# Patient Record
Sex: FEMALE | Race: WHITE
[De-identification: names, ages, dates, MRNs, and addresses within clinical notes are randomized per-mention and may not be internally consistent; named-entity substitution may affect disease eponyms.]

---

## 2017-02-20 ENCOUNTER — HOSPITAL ENCOUNTER (INPATIENT)
Dept: HOSPITAL 17 - PHED | Age: 82
LOS: 3 days | Discharge: HOME | DRG: 309 | End: 2017-02-23
Attending: HOSPITALIST | Admitting: HOSPITALIST
Payer: COMMERCIAL

## 2017-02-20 VITALS
RESPIRATION RATE: 18 BRPM | OXYGEN SATURATION: 93 % | DIASTOLIC BLOOD PRESSURE: 73 MMHG | TEMPERATURE: 98.9 F | HEART RATE: 87 BPM | SYSTOLIC BLOOD PRESSURE: 139 MMHG

## 2017-02-20 VITALS
OXYGEN SATURATION: 92 % | HEART RATE: 104 BPM | DIASTOLIC BLOOD PRESSURE: 71 MMHG | TEMPERATURE: 97.7 F | SYSTOLIC BLOOD PRESSURE: 172 MMHG | RESPIRATION RATE: 18 BRPM

## 2017-02-20 VITALS — WEIGHT: 101.19 LBS | HEIGHT: 61 IN | BODY MASS INDEX: 19.11 KG/M2

## 2017-02-20 VITALS
OXYGEN SATURATION: 96 % | TEMPERATURE: 97.1 F | SYSTOLIC BLOOD PRESSURE: 124 MMHG | DIASTOLIC BLOOD PRESSURE: 63 MMHG | RESPIRATION RATE: 18 BRPM | HEART RATE: 82 BPM

## 2017-02-20 VITALS
OXYGEN SATURATION: 93 % | TEMPERATURE: 98.8 F | SYSTOLIC BLOOD PRESSURE: 172 MMHG | HEART RATE: 95 BPM | DIASTOLIC BLOOD PRESSURE: 80 MMHG | RESPIRATION RATE: 18 BRPM

## 2017-02-20 VITALS
DIASTOLIC BLOOD PRESSURE: 70 MMHG | OXYGEN SATURATION: 95 % | HEART RATE: 93 BPM | RESPIRATION RATE: 18 BRPM | SYSTOLIC BLOOD PRESSURE: 155 MMHG | TEMPERATURE: 98.6 F

## 2017-02-20 VITALS
SYSTOLIC BLOOD PRESSURE: 156 MMHG | DIASTOLIC BLOOD PRESSURE: 75 MMHG | RESPIRATION RATE: 20 BRPM | HEART RATE: 99 BPM | TEMPERATURE: 98 F | OXYGEN SATURATION: 93 %

## 2017-02-20 VITALS
RESPIRATION RATE: 18 BRPM | SYSTOLIC BLOOD PRESSURE: 178 MMHG | OXYGEN SATURATION: 91 % | DIASTOLIC BLOOD PRESSURE: 68 MMHG | HEART RATE: 98 BPM

## 2017-02-20 VITALS
DIASTOLIC BLOOD PRESSURE: 95 MMHG | TEMPERATURE: 97.4 F | RESPIRATION RATE: 20 BRPM | SYSTOLIC BLOOD PRESSURE: 199 MMHG | OXYGEN SATURATION: 97 % | HEART RATE: 105 BPM

## 2017-02-20 VITALS — HEART RATE: 97 BPM

## 2017-02-20 DIAGNOSIS — I48.0: Primary | ICD-10-CM

## 2017-02-20 DIAGNOSIS — H91.90: ICD-10-CM

## 2017-02-20 DIAGNOSIS — N39.0: ICD-10-CM

## 2017-02-20 DIAGNOSIS — Z85.048: ICD-10-CM

## 2017-02-20 DIAGNOSIS — Z86.718: ICD-10-CM

## 2017-02-20 DIAGNOSIS — E78.00: ICD-10-CM

## 2017-02-20 DIAGNOSIS — E03.9: ICD-10-CM

## 2017-02-20 DIAGNOSIS — K56.41: ICD-10-CM

## 2017-02-20 DIAGNOSIS — Z79.02: ICD-10-CM

## 2017-02-20 DIAGNOSIS — I10: ICD-10-CM

## 2017-02-20 DIAGNOSIS — Z92.3: ICD-10-CM

## 2017-02-20 DIAGNOSIS — Z92.21: ICD-10-CM

## 2017-02-20 DIAGNOSIS — K21.9: ICD-10-CM

## 2017-02-20 DIAGNOSIS — E78.5: ICD-10-CM

## 2017-02-20 DIAGNOSIS — K62.5: ICD-10-CM

## 2017-02-20 LAB
ALP SERPL-CCNC: 119 U/L (ref 45–117)
ALT SERPL-CCNC: 37 U/L (ref 10–53)
ANION GAP SERPL CALC-SCNC: 11 MEQ/L (ref 5–15)
APTT BLD: 28.3 SEC (ref 24.3–30.1)
AST SERPL-CCNC: 22 U/L (ref 15–37)
BASOPHILS # BLD AUTO: 0.1 TH/MM3 (ref 0–0.2)
BASOPHILS NFR BLD: 0.4 % (ref 0–2)
BILIRUB SERPL-MCNC: 0.3 MG/DL (ref 0.2–1)
BUN SERPL-MCNC: 28 MG/DL (ref 7–18)
CHLORIDE SERPL-SCNC: 107 MEQ/L (ref 98–107)
COLOR UR: YELLOW
COMMENT (UR): (no result)
CULTURE IF INDICATED: (no result)
EOSINOPHIL # BLD: 0.1 TH/MM3 (ref 0–0.4)
EOSINOPHIL NFR BLD: 0.4 % (ref 0–4)
ERYTHROCYTE [DISTWIDTH] IN BLOOD BY AUTOMATED COUNT: 13.5 % (ref 11.6–17.2)
GFR SERPLBLD BASED ON 1.73 SQ M-ARVRAT: 47 ML/MIN (ref 89–?)
GLUCOSE UR STRIP-MCNC: (no result) MG/DL
HCO3 BLD-SCNC: 22.6 MEQ/L (ref 21–32)
HCT VFR BLD CALC: 36.8 % (ref 35–46)
HCT VFR BLD CALC: 37.9 % (ref 35–46)
HEMO FLAGS: (no result)
HGB UR QL STRIP: (no result)
HYALINE CASTS #/AREA URNS LPF: (no result) /LPF
INR PPP: 0.9 RATIO
KETONES UR STRIP-MCNC: (no result) MG/DL
LEUKOCYTE ESTERASE UR QL STRIP: (no result) /HPF (ref 0–5)
LYMPHOCYTES # BLD AUTO: 0.6 TH/MM3 (ref 1–4.8)
LYMPHOCYTES NFR BLD AUTO: 4.5 % (ref 9–44)
MCH RBC QN AUTO: 30.8 PG (ref 27–34)
MCHC RBC AUTO-ENTMCNC: 33.3 % (ref 32–36)
MCV RBC AUTO: 92.5 FL (ref 80–100)
METHOD OF COLLECTION: (no result)
MONOCYTES NFR BLD: 3.8 % (ref 0–8)
MUCOUS THREADS #/AREA URNS LPF: (no result) /LPF
NEUTROPHILS # BLD AUTO: 11.2 TH/MM3 (ref 1.8–7.7)
NEUTROPHILS NFR BLD AUTO: 90.9 % (ref 16–70)
NITRITE UR QL STRIP: (no result)
PLATELET # BLD: 218 TH/MM3 (ref 150–450)
POTASSIUM SERPL-SCNC: 3.8 MEQ/L (ref 3.5–5.1)
PROTHROMBIN TIME: 10.3 SEC (ref 9.8–11.6)
RBC # BLD AUTO: 4.09 MIL/MM3 (ref 4–5.3)
REVIEW FLAG: (no result)
SODIUM SERPL-SCNC: 141 MEQ/L (ref 136–145)
SP GR UR STRIP: 1.02 (ref 1–1.03)
SQUAMOUS #/AREA URNS HPF: (no result) /HPF (ref 0–5)
WBC # BLD AUTO: 12.5 TH/MM3 (ref 4–11)

## 2017-02-20 PROCEDURE — 85610 PROTHROMBIN TIME: CPT

## 2017-02-20 PROCEDURE — 85014 HEMATOCRIT: CPT

## 2017-02-20 PROCEDURE — 96374 THER/PROPH/DIAG INJ IV PUSH: CPT

## 2017-02-20 PROCEDURE — 74020: CPT

## 2017-02-20 PROCEDURE — 85018 HEMOGLOBIN: CPT

## 2017-02-20 PROCEDURE — 80053 COMPREHEN METABOLIC PANEL: CPT

## 2017-02-20 PROCEDURE — 81001 URINALYSIS AUTO W/SCOPE: CPT

## 2017-02-20 PROCEDURE — 84443 ASSAY THYROID STIM HORMONE: CPT

## 2017-02-20 PROCEDURE — 87077 CULTURE AEROBIC IDENTIFY: CPT

## 2017-02-20 PROCEDURE — 80048 BASIC METABOLIC PNL TOTAL CA: CPT

## 2017-02-20 PROCEDURE — 83735 ASSAY OF MAGNESIUM: CPT

## 2017-02-20 PROCEDURE — 71010: CPT

## 2017-02-20 PROCEDURE — 87086 URINE CULTURE/COLONY COUNT: CPT

## 2017-02-20 PROCEDURE — 87186 SC STD MICRODIL/AGAR DIL: CPT

## 2017-02-20 PROCEDURE — 93005 ELECTROCARDIOGRAM TRACING: CPT

## 2017-02-20 PROCEDURE — 84484 ASSAY OF TROPONIN QUANT: CPT

## 2017-02-20 PROCEDURE — 85730 THROMBOPLASTIN TIME PARTIAL: CPT

## 2017-02-20 PROCEDURE — 96361 HYDRATE IV INFUSION ADD-ON: CPT

## 2017-02-20 PROCEDURE — 83690 ASSAY OF LIPASE: CPT

## 2017-02-20 PROCEDURE — G0378 HOSPITAL OBSERVATION PER HR: HCPCS

## 2017-02-20 PROCEDURE — 85025 COMPLETE CBC W/AUTO DIFF WBC: CPT

## 2017-02-20 PROCEDURE — 93306 TTE W/DOPPLER COMPLETE: CPT

## 2017-02-20 PROCEDURE — 82550 ASSAY OF CK (CPK): CPT

## 2017-02-20 RX ADMIN — SODIUM CHLORIDE, PRESERVATIVE FREE SCH ML: 5 INJECTION INTRAVENOUS at 08:09

## 2017-02-20 RX ADMIN — CLOPIDOGREL BISULFATE SCH MG: 75 TABLET, FILM COATED ORAL at 09:00

## 2017-02-20 RX ADMIN — STANDARDIZED SENNA CONCENTRATE AND DOCUSATE SODIUM SCH TAB: 8.6; 5 TABLET, FILM COATED ORAL at 21:00

## 2017-02-20 RX ADMIN — STANDARDIZED SENNA CONCENTRATE AND DOCUSATE SODIUM SCH TAB: 8.6; 5 TABLET, FILM COATED ORAL at 09:00

## 2017-02-20 RX ADMIN — PRAVASTATIN SODIUM SCH MG: 20 TABLET ORAL at 09:00

## 2017-02-20 RX ADMIN — LEVOTHYROXINE SODIUM SCH MCG: 75 TABLET ORAL at 09:00

## 2017-02-20 RX ADMIN — SODIUM CHLORIDE, PRESERVATIVE FREE SCH ML: 5 INJECTION INTRAVENOUS at 21:50

## 2017-02-20 RX ADMIN — MULTIPLE VITAMINS W/ MINERALS TAB SCH TAB: TAB at 09:00

## 2017-02-20 NOTE — HHI.HP
__________________________________________________





HPI


Service


[facility]


Primary Care Physician


Jovany Ugarte MD


Admission Diagnosis


rectal bleed, rectal pain, constipation, fecal impaction, rectal can


Diagnoses:  


Travel History


International Travel<30 Days:  No


Contact w/Intl Traveler <30 Da:  No


Traveled to Known Affected Are:  No


History of Present Illness


This is a pleasant 86-year-old  female with past medical history of 

rectal cancer status post partial removal of her rectum years ago with Dr. Meese who presents with a five-day history of constipation.  The patient states 

that over the past year she has had cycles of loose stools followed by 

constipation.  She is complaining of some rectal pain.  Apparently she had a 

small amount of bright red blood from her rectum last night.  The patient had 

abdominal x-ray in the emergency department showing normal gas pattern.  Guaiac 

was positive for blood per rectum and she was noted to have a fecal impaction 

on rectal exam.  The patient was given magnesium citrate and this morning is 

having copious loose bowel movements.  She states that she still has pain in 

the rectum as well as some mild abdominal cramping prior to bowel movements but 

denies any further bleeding.  Hemoglobin is stable this morning.  The patient 

denies any recent weight loss, abdominal pain nausea or vomiting.





Review of Systems


Except as stated in HPI:  all other systems reviewed are Neg





Past Family Social History


Past Medical History


hypertension


colon cancer


dyslipidemia


hypothyroidism


Questionable history of TIA


Reported Medications





 Allergies








Coded Allergies Type Severity Reaction Last Updated Verified


 


  No Known Allergies    2/20/17 Yes








 Active Scripts








 Medications  Dose


 Route/Sig Days Date Category


 


 Levothyroxine


  (Levothyroxine


 Sodium) 75 Mcg Tab  75 Mcg


 PO DAILY   2/20/17 Reported


 


 Centrum (Multiple


 Vitamins W/


 Minerals) 1 Tab  1 Tab


 PO DAILY   2/20/17 Reported


 


 Vitamin C


  (Ascorbic Acid)


 500 Mg Cap  500 Mg


 PO   2/20/17 Reported


 


 Ventolin Hfa 18


 GM Inh (Albuterol


 Sulfate) 90


 Mcg/Act Aer  2 Puff


 INH Q4-6H PRN   2/20/17 Reported


 


 Amlodipine


  (Amlodipine


 Besylate) 5 Mg Tab  5 Mg


 PO DAILY   2/20/17 Reported


 


 Pravastatin 20 Mg


 Tab  20 Mg


 PO DAILY   2/20/17 Reported


 


 Plavix


  (Clopidogrel


 Bisulfate) 75 Mg


 Tab  75 Mg


 PO DAILY   2/20/17 Reported








Allergies:  


Coded Allergies:  


     No Known Allergies (Verified , 2/20/17)


Family History


Reviewed and noncontributory


Social History


No alcohol tobacco or drug use.  She is .





Physical Exam


Vital Signs





 Vital Signs








  Date Time  Temp Pulse Resp B/P Pulse Ox O2 Delivery O2 Flow Rate FiO2


 


2/20/17 08:00 98.8 95 18 172/80 93   


 


2/20/17 05:45  97      


 


2/20/17 05:20 98.0 99 20 156/75 93   


 


2/20/17 04:27 97.7 104 18 172/71 92 Room Air  


 


2/20/17 03:31  98 18 178/68 91 Room Air  


 


2/20/17 01:28 97.4 105 20 199/95 97   








Physical Exam


GENERAL: Well-nourished, well-developed pleasant elderly  female 

patient.


SKIN: Warm and dry.


HEAD: Normocephalic.


EYES: No scleral icterus. No injection or drainage. 


NECK: Supple, trachea midline. No JVD or lymphadenopathy.


CARDIOVASCULAR: Regular rate and rhythm without murmurs, gallops, or rubs. 


RESPIRATORY: Breath sounds equal and clear to auscultation bilaterally. No 

accessory muscle use.


GASTROINTESTINAL: Bowel sounds hyperactive Abdomen soft, non-tender, 

nondistended. 


EXTREMITIES: No cyanosis, or edema. 


NEUROLOGICAL: Awake, alert, and oriented x 3. Non-focal.


Laboratory





Laboratory Tests








Test 2/20/17 2/20/17





 02:25 08:50


 


White Blood Count 12.5  


 


Red Blood Count 4.09  


 


Hemoglobin 12.6  12.4 


 


Hematocrit 37.9  36.8 


 


Mean Corpuscular Volume 92.5  


 


Mean Corpuscular Hemoglobin 30.8  


 


Mean Corpuscular Hemoglobin 33.3  





Concent  


 


Red Cell Distribution Width 13.5  


 


Platelet Count 218  


 


Mean Platelet Volume 9.1  


 


Neutrophils (%) (Auto) 90.9  


 


Lymphocytes (%) (Auto) 4.5  


 


Monocytes (%) (Auto) 3.8  


 


Eosinophils (%) (Auto) 0.4  


 


Basophils (%) (Auto) 0.4  


 


Neutrophils # (Auto) 11.2  


 


Lymphocytes # (Auto) 0.6  


 


Monocytes # (Auto) 0.5  


 


Eosinophils # (Auto) 0.1  


 


Basophils # (Auto) 0.1  


 


CBC Comment DIFF FINAL  


 


Differential Comment   


 


Prothrombin Time 10.3  


 


Prothromb Time International 0.9  





Ratio  


 


Activated Partial 28.3  





Thromboplast Time  


 


Urine Collection Type CATH  


 


Urine Color YELLOW  


 


Urine Turbidity CLEAR  


 


Urine pH 6.0  


 


Urine Specific Gravity 1.017  


 


Urine Protein 100  


 


Urine Glucose (UA) NEG  


 


Urine Ketones NEG  


 


Urine Occult Blood NEG  


 


Urine Nitrite NEG  


 


Urine Bilirubin NEG  


 


Urine Leukocyte Esterase NEG  


 


Urine WBC 0-2  


 


Urine Squamous Epithelial 0-5  





Cells  


 


Urine Renal Epithelial Cells   


 


Urine Hyaline Casts 0-2  


 


Urine Mucus OCC  


 


Microscopic Urinalysis Comment CATH-CULT NOT 





 IND 


 


Sodium Level 141  


 


Potassium Level 3.8  


 


Chloride Level 107  


 


Carbon Dioxide Level 22.6  


 


Anion Gap 11  


 


Blood Urea Nitrogen 28  


 


Creatinine 1.10  


 


Estimat Glomerular Filtration 47  





Rate  


 


Random Glucose 163  


 


Calcium Level 9.1  


 


Total Bilirubin 0.3  


 


Aspartate Amino Transf 22  





(AST/SGOT)  


 


Alanine Aminotransferase 37  





(ALT/SGPT)  


 


Alkaline Phosphatase 119  


 


Total Protein 7.7  


 


Albumin 4.2  


 


Lipase 272  








Result Diagram:  


2/20/17 0850                                                                   

             2/20/17 0225





Imaging





Last Impressions








Abdomen X-Ray 2/20/17 0431 Signed





Impressions: 





 Service Date/Time:  Monday, February 20, 2017 04:47 - CONCLUSION:  Negative 





 abdominal series. An acute abnormality is not seen. There is chronic change 





 described above.     Taye Coyle MD 











Assessment and Plan


Problem List:  


(1) Fecal impaction in rectum


ICD Code:  K56.41


Status:  Acute


(2) Rectal bleeding


ICD Code:  K62.5


Status:  Acute


Assessment and Plan











-Rectal impaction, resolved with mag citrate now having numerous loose stools.


-History of rectal cancer resected years ago with Dr. Fernandez.


-Trace amount of rectal bleeding likely related to the rectal impaction now 

resolved with stable hemoglobin.


-History of DVT for which she apparently takes Plavix.  She denies any history 

of coronary artery disease.


-hypertension


-dyslipidemia


-hypothyroidism








We will observe the patient through this morning.  Obtain a physical therapy 

consultation.  If she does well she can be discharged home this afternoon.  She 

needs to stay on a good bowel regimen as she has had a year-long history of 

constipation alternating with loose stools and this may be indicative of 

recurrent fecal impaction.  There was no mass identified via digital 

examination of her rectum by the ER physician.  I recommend she follow-up with 

her primary care physician and may consider a follow-up appointment with Dr. Fernandez who performed her surgery for rectal carcinoma years ago.





Plan of care discussed with the patient and her  at bedside who are in 

agreement with plan.








Gloria Moran MD Feb 20, 2017 10:34

## 2017-02-20 NOTE — PD
HPI


Chief Complaint:  GI Complaint


Time Seen by Provider:  02:02


Travel History


International Travel<30 days:  No


Contact w/Intl Traveler<30days:  No


Traveled to known affect area:  No





History of Present Illness


HPI


The patient is an 86-year-old female who states she had diarrhea on Tuesday, 5 

days ago, and she hasn't had a bowel movement since.  She states she had noted 

blood passing from the rectum tonight and this is why she came in.  She states 

she has a cycle of diarrhea and then no bowel movement for a number of days for 

the past year.  She has a history of rectal cancer, and 1996 Dr. Fernandez removed 

a portion of her rectum.  She has not seen Dr. Fernandez in for years.  She is on 

Plavix because of deep vein thrombosis.  She denies any syncopal or near 

syncopal spells.  She does have rectal pain.





PFSH


Past Medical History


Hx Anticoagulant Therapy:  Yes (PLAVIX - BLOOD CLOT R FEMORAL )


Arthritis:  Yes


Autoimmune Disease:  No


Blood Disorders:  No


Anxiety:  No


Depression:  No


Cancer:  Yes (hx rectal ca 17 years ago)


Cardiovascular Problems:  Yes (BLOOD CLOTS LEGS)


High Cholesterol:  Yes


Chemotherapy:  Yes (1996)


Chest Pain:  No


Congestive Heart Failure:  No


Diminished Hearing:  Yes (NOT WITH PT THIS VISIT 2/20/17)


Deep Vein Thrombosis:  Yes (R GROIN )


Endocrine:  Yes (hypothyroid)


Gastrointestinal Disorders:  Yes


GERD:  Yes


Genitourinary:  Yes (STENT LEFT KIDNEY)


Hypertension:  Yes


Immune Disorder:  No


Implanted Vascular Access Dvce:  Yes


Musculoskeletal:  Yes


Neurologic:  No


Psychiatric:  No


Reproductive:  No


Respiratory:  No


Radiation Therapy:  Yes (1996)


Sickle Cell Disease:  No


Thyroid Disease:  Yes (HYPO)


Menopausal:  Yes





Past Surgical History


Abdominal Surgery:  Yes (APPPRADEEP, CHOLEY)


Appendectomy:  Yes


Body Medical Devices:  RENAL STENT


Cardiac Surgery:  No


Cholecystectomy:  Yes


Genitourinary Surgery:  Yes (RENAL STENT, RECTAL CANCER)


Gynecologic Surgery:  No


Hysterectomy:  Yes


Thoracic Surgery:  No


Tonsillectomy:  Yes


Other Surgery:  Yes





Social History


Alcohol Use:  No


Tobacco Use:  No (quit 30 years ago)


Substance Use:  No





Allergies-Medications


(Allergen,Severity, Reaction):  


Coded Allergies:  


     No Known Allergies (Verified , 2/20/17)


Reported Meds & Prescriptions





Reported Meds & Active Scripts


Active


Reported


Levothyroxine (Levothyroxine Sodium) 75 Mcg Tab 75 Mcg PO DAILY


Centrum (Multiple Vitamins W/ Minerals) 1 Tab 1 Tab PO DAILY


Vitamin C (Ascorbic Acid) 500 Mg Cap 500 Mg PO 


Ventolin Hfa 18 GM Inh (Albuterol Sulfate) 90 Mcg/Act Aer 2 Puff INH Q4-6H PRN


Amlodipine (Amlodipine Besylate) 5 Mg Tab 5 Mg PO DAILY


Pravastatin 20 Mg Tab 20 Mg PO DAILY


Plavix (Clopidogrel Bisulfate) 75 Mg Tab 75 Mg PO DAILY








Review of Systems


Except as stated in HPI:  all other systems reviewed are Neg





Physical Exam


Narrative


GENERAL: The patient is thin, alert, oriented 3, slightly anemic appearing in 

moderate apparent distress with her rectal discomfort.


SKIN: Warm and dry.


HEAD: Atraumatic. Normocephalic. 


EYES: Pupils equal and round. No scleral icterus. No injection or drainage. 


ENT: No nasal bleeding or discharge.  Mucous membranes pink and moist.


NECK: Trachea midline. No JVD. 


CARDIOVASCULAR: Regular rate and rhythm.  No murmur appreciated.


RESPIRATORY: No accessory muscle use. Clear to auscultation. Breath sounds 

equal bilaterally. 


GASTROINTESTINAL: Abdomen soft, non-tender, nondistended. Hepatic and splenic 

margins not palpable.  No guarding or rebound is present.  


MUSCULOSKELETAL: No obvious deformities. No clubbing.  No cyanosis.  No edema. 


NEUROLOGICAL: Awake and alert. No obvious cranial nerve deficits.  Motor 

grossly within normal limits. Normal speech.


PSYCHIATRIC: Appropriate mood and affect; insight and judgment normal.


RECTAL EXAM: There is a fecal impaction in the rectum.  There is also bright 

red blood in the rectum and stool is brown but guaiac positive.  There is 

considerable tenderness in the rectum.  No masses are felt.





Data


Data


Last Documented VS





Vital Signs








  Date Time  Temp Pulse Resp B/P Pulse Ox O2 Delivery O2 Flow Rate FiO2


 


2/20/17 03:31  98 18 178/68 91 Room Air  


 


2/20/17 01:28 97.4       








Orders





 Complete Blood Count With Diff (2/20/17 02:02)


Lipase (2/20/17 02:02)


Prothrombin Time / Inr (Pt) (2/20/17 02:02)


Act Partial Throm Time (Ptt) (2/20/17 02:02)


Urinalysis - C+S If Indicated (2/20/17 02:02)


Ecg Monitoring (2/20/17 02:02)


Iv Access Insert/Monitor (2/20/17 02:02)


Oximetry (2/20/17 02:02)


Sodium Chlor 0.9% 1000 Ml Inj (Ns 1000 M (2/20/17 02:02)


Sodium Chloride 0.9% Flush (Ns Flush) (2/20/17 02:15)


Fleets Enema (Adult) (Fleets Enema (Adul (2/20/17 02:15)


Comprehensive Metabolic Panel (2/20/17 02:25)


Ondansetron Inj (Zofran Inj) (2/20/17 02:45)


Magnesium Citrate Liq (Citroma Liq) (2/20/17 03:45)





Labs








 Laboratory Tests








Test 2/20/17





 02:25


 


White Blood Count 12.5 TH/MM3


 


Red Blood Count 4.09 MIL/MM3


 


Hemoglobin 12.6 GM/DL


 


Hematocrit 37.9 %


 


Mean Corpuscular Volume 92.5 FL


 


Mean Corpuscular Hemoglobin 30.8 PG


 


Mean Corpuscular Hemoglobin 33.3 %





Concent 


 


Red Cell Distribution Width 13.5 %


 


Platelet Count 218 TH/MM3


 


Mean Platelet Volume 9.1 FL


 


Neutrophils (%) (Auto) 90.9 %


 


Lymphocytes (%) (Auto) 4.5 %


 


Monocytes (%) (Auto) 3.8 %


 


Eosinophils (%) (Auto) 0.4 %


 


Basophils (%) (Auto) 0.4 %


 


Neutrophils # (Auto) 11.2 TH/MM3


 


Lymphocytes # (Auto) 0.6 TH/MM3


 


Monocytes # (Auto) 0.5 TH/MM3


 


Eosinophils # (Auto) 0.1 TH/MM3


 


Basophils # (Auto) 0.1 TH/MM3


 


CBC Comment DIFF FINAL 


 


Differential Comment  


 


Prothrombin Time 10.3 SEC


 


Prothromb Time International 0.9 RATIO





Ratio 


 


Activated Partial 28.3 SEC





Thromboplast Time 


 


Urine Collection Type CATH 


 


Urine Color YELLOW 


 


Urine Turbidity CLEAR 


 


Urine pH 6.0 


 


Urine Specific Gravity 1.017 


 


Urine Protein 100 mg/dL


 


Urine Glucose (UA) NEG mg/dL


 


Urine Ketones NEG mg/dL


 


Urine Occult Blood NEG 


 


Urine Nitrite NEG 


 


Urine Bilirubin NEG 


 


Urine Leukocyte Esterase NEG 


 


Urine WBC 0-2 /hpf


 


Urine Squamous Epithelial 0-5 /hpf





Cells 


 


Urine Renal Epithelial Cells  /hpf


 


Urine Hyaline Casts 0-2 /lpf


 


Urine Mucus OCC /lpf


 


Microscopic Urinalysis Comment CATH-CULT NOT





 IND


 


Sodium Level 141 MEQ/L


 


Potassium Level 3.8 MEQ/L


 


Chloride Level 107 MEQ/L


 


Carbon Dioxide Level 22.6 MEQ/L


 


Anion Gap 11 MEQ/L


 


Blood Urea Nitrogen 28 MG/DL


 


Creatinine 1.10 MG/DL


 


Estimat Glomerular Filtration 47 ML/MIN





Rate 


 


Random Glucose 163 MG/DL


 


Calcium Level 9.1 MG/DL


 


Total Bilirubin 0.3 MG/DL


 


Aspartate Amino Transf 22 U/L





(AST/SGOT) 


 


Alanine Aminotransferase 37 U/L





(ALT/SGPT) 


 


Alkaline Phosphatase 119 U/L


 


Total Protein 7.7 GM/DL


 


Albumin 4.2 GM/DL


 


Lipase 272 U/L














[facility]


Medical Decision Making


Medical Screen Exam Complete:  Yes


Emergency Medical Condition:  Yes


Medical Record Reviewed:  Yes


Differential Diagnosis


Rectal bleed, anemia, electrolyte disorder, dehydration, intractable rectal pain

, diarrhea/constipation episodes


Narrative Course


The patient has rectal bleed.  She also has a fecal impaction with significant 

rectal pain.  She did vomit once here but nausea is infrequent for her she 

states.  He is now not nauseated.





Plan: The patient be admitted to Dr. Boston for 23 hour observation.





Physician Communication


Physician Communication


I discussed the patient with Dr. Boston, the patient will be a admitted for 23 

hour observation.





Diagnosis





 Primary Impression:  


 Rectal bleeding


 Additional Impressions:  


 Rectal cancer


 Rectal pain





Admitting Information


Admitting Physician Requests:  Observation








Theo Mcconnell MD Feb 20, 2017 02:09

## 2017-02-21 VITALS
OXYGEN SATURATION: 98 % | DIASTOLIC BLOOD PRESSURE: 67 MMHG | SYSTOLIC BLOOD PRESSURE: 121 MMHG | HEART RATE: 73 BPM | RESPIRATION RATE: 18 BRPM | TEMPERATURE: 97.2 F

## 2017-02-21 VITALS
SYSTOLIC BLOOD PRESSURE: 153 MMHG | OXYGEN SATURATION: 93 % | DIASTOLIC BLOOD PRESSURE: 90 MMHG | HEART RATE: 94 BPM | RESPIRATION RATE: 18 BRPM | TEMPERATURE: 97 F

## 2017-02-21 VITALS
HEART RATE: 84 BPM | TEMPERATURE: 97.1 F | DIASTOLIC BLOOD PRESSURE: 71 MMHG | RESPIRATION RATE: 18 BRPM | SYSTOLIC BLOOD PRESSURE: 143 MMHG | OXYGEN SATURATION: 98 %

## 2017-02-21 VITALS
TEMPERATURE: 97.5 F | DIASTOLIC BLOOD PRESSURE: 72 MMHG | SYSTOLIC BLOOD PRESSURE: 134 MMHG | OXYGEN SATURATION: 94 % | HEART RATE: 61 BPM | RESPIRATION RATE: 20 BRPM

## 2017-02-21 VITALS — OXYGEN SATURATION: 93 %

## 2017-02-21 VITALS
TEMPERATURE: 97.5 F | RESPIRATION RATE: 20 BRPM | DIASTOLIC BLOOD PRESSURE: 67 MMHG | OXYGEN SATURATION: 95 % | SYSTOLIC BLOOD PRESSURE: 116 MMHG | HEART RATE: 65 BPM

## 2017-02-21 VITALS
RESPIRATION RATE: 20 BRPM | DIASTOLIC BLOOD PRESSURE: 61 MMHG | HEART RATE: 68 BPM | SYSTOLIC BLOOD PRESSURE: 128 MMHG | OXYGEN SATURATION: 97 % | TEMPERATURE: 97.5 F

## 2017-02-21 LAB
ANION GAP SERPL CALC-SCNC: 10 MEQ/L (ref 5–15)
BACTERIA #/AREA URNS HPF: (no result) /HPF
BASOPHILS # BLD AUTO: 0 TH/MM3 (ref 0–0.2)
BASOPHILS NFR BLD: 0.4 % (ref 0–2)
BUN SERPL-MCNC: 9 MG/DL (ref 7–18)
CHLORIDE SERPL-SCNC: 110 MEQ/L (ref 98–107)
COLOR UR: YELLOW
COMMENT (UR): (no result)
CULTURE IF INDICATED: (no result)
EOSINOPHIL # BLD: 0.1 TH/MM3 (ref 0–0.4)
EOSINOPHIL NFR BLD: 1.1 % (ref 0–4)
ERYTHROCYTE [DISTWIDTH] IN BLOOD BY AUTOMATED COUNT: 13.6 % (ref 11.6–17.2)
GFR SERPLBLD BASED ON 1.73 SQ M-ARVRAT: 77 ML/MIN (ref 89–?)
GLUCOSE UR STRIP-MCNC: (no result) MG/DL
HCO3 BLD-SCNC: 24 MEQ/L (ref 21–32)
HCT VFR BLD CALC: 35.4 % (ref 35–46)
HEMO FLAGS: (no result)
HGB UR QL STRIP: (no result)
KETONES UR STRIP-MCNC: (no result) MG/DL
LEUKOCYTE ESTERASE UR QL STRIP: (no result) /HPF (ref 0–5)
LYMPHOCYTES # BLD AUTO: 0.8 TH/MM3 (ref 1–4.8)
LYMPHOCYTES NFR BLD AUTO: 7.7 % (ref 9–44)
MCH RBC QN AUTO: 30.5 PG (ref 27–34)
MCHC RBC AUTO-ENTMCNC: 32.6 % (ref 32–36)
MCV RBC AUTO: 93.5 FL (ref 80–100)
MONOCYTES NFR BLD: 5.7 % (ref 0–8)
NEUTROPHILS # BLD AUTO: 8.4 TH/MM3 (ref 1.8–7.7)
NEUTROPHILS NFR BLD AUTO: 85.1 % (ref 16–70)
NITRITE UR QL STRIP: (no result)
PLATELET # BLD: 168 TH/MM3 (ref 150–450)
POTASSIUM SERPL-SCNC: 3.2 MEQ/L (ref 3.5–5.1)
RBC # BLD AUTO: 3.79 MIL/MM3 (ref 4–5.3)
RBC #/AREA URNS HPF: (no result) /HPF (ref 0–3)
SODIUM SERPL-SCNC: 144 MEQ/L (ref 136–145)
SP GR UR STRIP: 1.01 (ref 1–1.03)
WBC # BLD AUTO: 9.9 TH/MM3 (ref 4–11)

## 2017-02-21 RX ADMIN — STANDARDIZED SENNA CONCENTRATE AND DOCUSATE SODIUM SCH TAB: 8.6; 5 TABLET, FILM COATED ORAL at 20:52

## 2017-02-21 RX ADMIN — SODIUM CHLORIDE, PRESERVATIVE FREE SCH ML: 5 INJECTION INTRAVENOUS at 20:52

## 2017-02-21 RX ADMIN — PRAVASTATIN SODIUM SCH MG: 20 TABLET ORAL at 08:10

## 2017-02-21 RX ADMIN — CLOPIDOGREL BISULFATE SCH MG: 75 TABLET, FILM COATED ORAL at 08:10

## 2017-02-21 RX ADMIN — SODIUM CHLORIDE, PRESERVATIVE FREE SCH ML: 5 INJECTION INTRAVENOUS at 08:11

## 2017-02-21 RX ADMIN — STANDARDIZED SENNA CONCENTRATE AND DOCUSATE SODIUM SCH TAB: 8.6; 5 TABLET, FILM COATED ORAL at 08:11

## 2017-02-21 RX ADMIN — LEVOTHYROXINE SODIUM SCH MCG: 75 TABLET ORAL at 05:38

## 2017-02-21 RX ADMIN — MULTIPLE VITAMINS W/ MINERALS TAB SCH TAB: TAB at 08:09

## 2017-02-21 NOTE — HHI.PR
Subjective


Remarks


Patient feels improved today.  She ambulated well with physical therapy.  Loose 

stools have slowed down and she is eating well.  However she is feeling some 

dysuria suprapubic pressure and chills which she states is typical for her when 

she gets a UTI.  She's had frequent UTIs over the past several months.





Objective


Vitals





 Vital Signs








  Date Time  Temp Pulse Resp B/P Pulse Ox O2 Delivery O2 Flow Rate FiO2


 


2/21/17 12:00 97.5 65 20 116/67 95   


 


2/21/17 07:52 97.5 61 20 134/72 94   


 


2/21/17 04:45     93 Nasal Cannula 2.00 


 


2/21/17 04:00 97.9 94 18 153/90 93   


 


2/21/17 00:00 97.1 84 18 143/71 98   


 


2/20/17 20:00  82      


 


2/20/17 20:00 97.1 84 18 124/63 96   


 


2/20/17 15:30 98.6 93 18 155/70 95   








 I/O








 2/20/17 2/20/17 2/20/17 2/21/17 2/21/17 2/21/17





 07:00 15:00 23:00 07:00 15:00 23:00


 


Intake Total  1180 ml   780 ml 


 


Balance  1180 ml   780 ml 


 


      


 


Intake Oral  480 ml   780 ml 


 


IV Total  700 ml    


 


# Voids  12  5 2 


 


# Bowel Movements 1 16  8 1 








Result Diagram:  


2/21/17 0540                                                                   

             2/21/17 0540





Objective Remarks


GENERAL: Well-nourished, well-developed very pleasant elderly  female 

patient.


SKIN: Warm and dry.


HEAD: Normocephalic.


EYES: No scleral icterus. No injection or drainage. 


NECK: Supple, trachea midline. No JVD or lymphadenopathy.


CARDIOVASCULAR: Regular rate and rhythm without murmurs, gallops, or rubs. 


RESPIRATORY: Breath sounds equal bilaterally. No accessory muscle use.


GASTROINTESTINAL: Abdomen soft, non-tender, nondistended. 


EXTREMITIES: No cyanosis, or edema. 


NEUROLOGICAL: Awake, alert, and oriented x 3. Non-focal.





A/P


Problem List:  


(1) Fecal impaction in rectum


ICD Code:  K56.41


Status:  Resolved


(2) Rectal bleeding


ICD Code:  K62.5


Status:  Resolved


(3) UTI (urinary tract infection)


ICD Code:  N39.0


Status:  Acute


Assessment and Plan














-Rectal impaction, resolved with mag citrate now having numerous loose stools.


-History of anal cancer resected years ago with Dr. Fernandez.


-Trace amount of rectal bleeding likely related to the rectal impaction now 

resolved with stable hemoglobin.


-History of DVT for which she apparently takes Plavix.  She denies any history 

of coronary artery disease.


-hypertension


-dyslipidemia


-hypothyroidism


-UTI





Patient improved today.  Her loose stools have slowed down.  She is having 

symptoms consistent with urinary tract infection and UA is suspicious for 

infection.  Will give Rocephin 1 g IV now.  Prescribed Ceftin for 7 days.  The 

patient is to follow-up in 2 weeks with Dr. Vasquez for flexible sigmoidoscopy.  

The patient was encouraged to call her primary care physician's office on 

Friday to ensure that her urinary tract infection is susceptible to Ceftin.  

She is counseled to return to the ER for fever nausea vomiting or worsening 

chills.  Home health care has been arranged.  The patient will be discharged 

home this afternoon.  Plan of care discussed with the patient, her  and 

her daughter.








Gloria Moran MD Feb 21, 2017 15:01

## 2017-02-21 NOTE — HHI.FF
Face to Face Verification


Diagnosis:  


(1) Fecal impaction in rectum


(2) Rectal bleeding


Home Health Nursing


Order:     Nursing assessment with vital signs








I have seen patient Lanette Moy on 2/21/17. My clinical findings 

support the need for the requested home health care services because:


Deconditioned w/ increased weakness


Limited ability to care for self


Need for psychosocial assistance








I certify that my clinical findings support that this patient is homebound 

because:


Need for psychosocial assistance








Gloria Moran MD Feb 21, 2017 15:25

## 2017-02-21 NOTE — HHI.PR
Subjective


Remarks


I was informed by RN at ~1800 hours that patient has diarrhea and does not feel 

comfortable being discharged as there is no family to care for her tonight and 

home health care does not start until tomorrow.  I informed Dr. Moran.  

Discharge will be held.  Patient to start po Ceftin in the morning.





Objective


Vitals





 Vital Signs








  Date Time  Temp Pulse Resp B/P Pulse Ox O2 Delivery O2 Flow Rate FiO2


 


2/21/17 16:00 97.5 68 20 128/61 97   


 


2/21/17 12:00 97.5 65 20 116/67 95   


 


2/21/17 07:52 97.5 61 20 134/72 94   


 


2/21/17 04:45     93 Nasal Cannula 2.00 


 


2/21/17 04:00 97.9 94 18 153/90 93   


 


2/21/17 00:00 97.1 84 18 143/71 98   


 


2/20/17 20:00  82      


 


2/20/17 20:00 97.1 84 18 124/63 96   








 I/O








 2/20/17 2/20/17 2/20/17 2/21/17 2/21/17 2/21/17





 07:00 15:00 23:00 07:00 15:00 23:00


 


Intake Total  1180 ml   780 ml 570 ml


 


Balance  1180 ml   780 ml 570 ml


 


      


 


Intake Oral  480 ml   780 ml 570 ml


 


IV Total  700 ml    


 


# Voids  12  5 2 1


 


# Bowel Movements 1 16  8 1 2








Result Diagram:  


2/21/17 0540                                                                   

             2/21/17 0540








A/P


Problem List:  


(1) Fecal impaction in rectum


ICD Code:  K56.41


Status:  Resolved


(2) Rectal bleeding


ICD Code:  K62.5


Status:  Resolved


(3) UTI (urinary tract infection)


ICD Code:  N39.0


Status:  Acute








Kaila Marcus Feb 21, 2017 18:54

## 2017-02-22 VITALS — DIASTOLIC BLOOD PRESSURE: 77 MMHG | SYSTOLIC BLOOD PRESSURE: 115 MMHG | HEART RATE: 92 BPM

## 2017-02-22 VITALS
DIASTOLIC BLOOD PRESSURE: 60 MMHG | OXYGEN SATURATION: 98 % | TEMPERATURE: 97.1 F | HEART RATE: 66 BPM | SYSTOLIC BLOOD PRESSURE: 120 MMHG | RESPIRATION RATE: 18 BRPM

## 2017-02-22 VITALS
HEART RATE: 80 BPM | RESPIRATION RATE: 20 BRPM | SYSTOLIC BLOOD PRESSURE: 115 MMHG | DIASTOLIC BLOOD PRESSURE: 80 MMHG | OXYGEN SATURATION: 95 % | TEMPERATURE: 97.7 F

## 2017-02-22 VITALS — HEART RATE: 136 BPM | OXYGEN SATURATION: 91 %

## 2017-02-22 VITALS
RESPIRATION RATE: 21 BRPM | SYSTOLIC BLOOD PRESSURE: 125 MMHG | TEMPERATURE: 97.7 F | DIASTOLIC BLOOD PRESSURE: 65 MMHG | OXYGEN SATURATION: 97 % | HEART RATE: 62 BPM

## 2017-02-22 VITALS — DIASTOLIC BLOOD PRESSURE: 84 MMHG | SYSTOLIC BLOOD PRESSURE: 120 MMHG | HEART RATE: 86 BPM

## 2017-02-22 VITALS
HEART RATE: 82 BPM | DIASTOLIC BLOOD PRESSURE: 80 MMHG | OXYGEN SATURATION: 96 % | TEMPERATURE: 97.2 F | SYSTOLIC BLOOD PRESSURE: 118 MMHG | RESPIRATION RATE: 20 BRPM

## 2017-02-22 VITALS
DIASTOLIC BLOOD PRESSURE: 65 MMHG | OXYGEN SATURATION: 94 % | HEART RATE: 87 BPM | RESPIRATION RATE: 20 BRPM | SYSTOLIC BLOOD PRESSURE: 101 MMHG

## 2017-02-22 VITALS — HEART RATE: 102 BPM | DIASTOLIC BLOOD PRESSURE: 68 MMHG | SYSTOLIC BLOOD PRESSURE: 108 MMHG

## 2017-02-22 VITALS
RESPIRATION RATE: 18 BRPM | TEMPERATURE: 97.8 F | OXYGEN SATURATION: 95 % | HEART RATE: 66 BPM | DIASTOLIC BLOOD PRESSURE: 58 MMHG | SYSTOLIC BLOOD PRESSURE: 117 MMHG

## 2017-02-22 VITALS
HEART RATE: 88 BPM | OXYGEN SATURATION: 94 % | SYSTOLIC BLOOD PRESSURE: 92 MMHG | DIASTOLIC BLOOD PRESSURE: 58 MMHG | RESPIRATION RATE: 20 BRPM

## 2017-02-22 VITALS — SYSTOLIC BLOOD PRESSURE: 116 MMHG | DIASTOLIC BLOOD PRESSURE: 66 MMHG

## 2017-02-22 LAB
ANION GAP SERPL CALC-SCNC: 11 MEQ/L (ref 5–15)
BASOPHILS # BLD AUTO: 0 TH/MM3 (ref 0–0.2)
BASOPHILS NFR BLD: 0.5 % (ref 0–2)
BUN SERPL-MCNC: 9 MG/DL (ref 7–18)
CHLORIDE SERPL-SCNC: 107 MEQ/L (ref 98–107)
CK SERPL-CCNC: 41 U/L (ref 26–192)
CK SERPL-CCNC: 55 U/L (ref 26–192)
EOSINOPHIL # BLD: 0.3 TH/MM3 (ref 0–0.4)
EOSINOPHIL NFR BLD: 3.2 % (ref 0–4)
ERYTHROCYTE [DISTWIDTH] IN BLOOD BY AUTOMATED COUNT: 13.5 % (ref 11.6–17.2)
GFR SERPLBLD BASED ON 1.73 SQ M-ARVRAT: 71 ML/MIN (ref 89–?)
HCO3 BLD-SCNC: 24.5 MEQ/L (ref 21–32)
HCT VFR BLD CALC: 42.9 % (ref 35–46)
HEMO FLAGS: (no result)
LYMPHOCYTES # BLD AUTO: 1.1 TH/MM3 (ref 1–4.8)
LYMPHOCYTES NFR BLD AUTO: 11.8 % (ref 9–44)
MAGNESIUM SERPL-MCNC: 2.3 MG/DL (ref 1.5–2.5)
MCH RBC QN AUTO: 30.2 PG (ref 27–34)
MCHC RBC AUTO-ENTMCNC: 32.7 % (ref 32–36)
MCV RBC AUTO: 92.3 FL (ref 80–100)
MONOCYTES NFR BLD: 6.1 % (ref 0–8)
NEUTROPHILS # BLD AUTO: 7.2 TH/MM3 (ref 1.8–7.7)
NEUTROPHILS NFR BLD AUTO: 78.4 % (ref 16–70)
PLATELET # BLD: 203 TH/MM3 (ref 150–450)
POTASSIUM SERPL-SCNC: 3.7 MEQ/L (ref 3.5–5.1)
RBC # BLD AUTO: 4.65 MIL/MM3 (ref 4–5.3)
SODIUM SERPL-SCNC: 142 MEQ/L (ref 136–145)
WBC # BLD AUTO: 9.2 TH/MM3 (ref 4–11)

## 2017-02-22 RX ADMIN — STANDARDIZED SENNA CONCENTRATE AND DOCUSATE SODIUM SCH TAB: 8.6; 5 TABLET, FILM COATED ORAL at 09:05

## 2017-02-22 RX ADMIN — CLOPIDOGREL BISULFATE SCH MG: 75 TABLET, FILM COATED ORAL at 09:05

## 2017-02-22 RX ADMIN — MULTIPLE VITAMINS W/ MINERALS TAB SCH TAB: TAB at 09:05

## 2017-02-22 RX ADMIN — SODIUM CHLORIDE, PRESERVATIVE FREE SCH ML: 5 INJECTION INTRAVENOUS at 20:46

## 2017-02-22 RX ADMIN — LEVOTHYROXINE SODIUM SCH MCG: 75 TABLET ORAL at 06:09

## 2017-02-22 RX ADMIN — PRAVASTATIN SODIUM SCH MG: 20 TABLET ORAL at 09:05

## 2017-02-22 RX ADMIN — STANDARDIZED SENNA CONCENTRATE AND DOCUSATE SODIUM SCH TAB: 8.6; 5 TABLET, FILM COATED ORAL at 20:47

## 2017-02-22 RX ADMIN — SODIUM CHLORIDE, PRESERVATIVE FREE SCH ML: 5 INJECTION INTRAVENOUS at 09:05

## 2017-02-22 RX ADMIN — ENOXAPARIN SODIUM SCH MG: 40 INJECTION SUBCUTANEOUS at 22:23

## 2017-02-22 RX ADMIN — CEFUROXIME AXETIL SCH MG: 500 TABLET ORAL at 09:05

## 2017-02-22 RX ADMIN — CEFUROXIME AXETIL SCH MG: 500 TABLET ORAL at 20:46

## 2017-02-22 RX ADMIN — ENOXAPARIN SODIUM SCH MG: 40 INJECTION SUBCUTANEOUS at 11:39

## 2017-02-22 NOTE — EKG
Date Performed: 02/22/2017       Time Performed: 07:04:16

 

PTAGE:      86 years

 

EKG:      Atrial fibrillation. Poor R wave progression - probable normal variant Low QRS voltages in 
precordial leads Abnormal ECG

 

PREVIOUS TRACING       : 10/06/2013 18.49 Compared to the previous tracing,  tachycardia no longer pr
esent

 

DOCTOR:   Weston Kate  Interpretating Date/Time  02/22/2017 21:52:51

## 2017-02-22 NOTE — HHI.PR
Subjective


Remarks


Patient is seen at this time in follow-up for palpitations which developed 

overnight.  Patient apparently was admitted for abdominal discomfort and some 

concern for fecal impaction which has since resolved.  No evidence of bleeding 

on rectal exam.  Patient now with hemoglobin which is stable.  Overnight she 

developed some palpitations and discomfort and was found to have atrial 

fibrillation with rapid ventricular response in the 130s.  This did resolve 

initially with IV Cardizem did return.  Patient notes no history of cardiac 

arrhythmias or cardiac disease.  She today is feeling palpitations and chest 

discomfort.





Objective


Vitals





 Vital Signs








  Date Time  Temp Pulse Resp B/P Pulse Ox O2 Delivery O2 Flow Rate FiO2


 


2/22/17 08:00 97.2 82 20 118/80 96   


 


2/22/17 07:15  87 20 101/65 94   


 


2/22/17 06:54  88 20 92/58 94   


 


2/22/17 06:50  136   91   


 


2/22/17 00:00 97.1 66 18 120/60 98   


 


2/21/17 20:00 97.2 68 18 121/67 98   


 


2/21/17 20:00  73      


 


2/21/17 16:00 97.5 68 20 128/61 97   


 


2/21/17 12:00 97.5 65 20 116/67 95   








 I/O








 2/21/17 2/21/17 2/21/17 2/22/17 2/22/17 2/22/17





 07:00 15:00 23:00 07:00 15:00 23:00


 


Intake Total  780 ml 690 ml   


 


Balance  780 ml 690 ml   


 


      


 


Intake Oral  780 ml 690 ml   


 


# Voids 5 2 3 6  


 


# Bowel Movements 8 1 4 3  








Result Diagram:  


2/22/17 0705 2/22/17 0705





Imaging





Last Impressions








Abdomen X-Ray 2/20/17 0431 Signed





Impressions: 





 Service Date/Time:  Monday, February 20, 2017 04:47 - CONCLUSION:  Negative 





 abdominal series. An acute abnormality is not seen. There is chronic change 





 described above.     Taye Coyle MD 








Objective Remarks


GENERAL: This is a well-nourished, well-developed patient, in no apparent 

distress.


CARDIOVASCULAR:afib, RVR without murmurs, gallops, or rubs. 


RESPIRATORY: Clear to auscultation. Breath sounds equal bilaterally. No wheezes

, rales, or rhonchi.  


GASTROINTESTINAL: Abdomen soft, non-tender, nondistended. Normal active bowel 

sounds


MUSCULOSKELETAL: Extremities without clubbing, cyanosis, or edema.


NEURO:  Alert & Oriented x4 to person, place, time, situation.  Moves all ext x4





A/P


Problem List:  


(1) Fecal impaction in rectum


ICD Code:  K56.41


Status:  Resolved


Plan:  Resolved after 24 bowel movements in the last 24 hours





(2) Rectal bleeding


ICD Code:  K62.5


Status:  Resolved


Plan:  No evidence of bleeding on exam at this time, hemoglobin stable.  

Patient need outpatient flexible sigmoidoscopy per colorectal





(3) UTI (urinary tract infection)


ICD Code:  N39.0


Status:  Acute


Plan:  Empiric antibiotics have been given.  Cultures are still pending





(4) Atrial fibrillation


ICD Code:  I48.91


Status:  Acute


Plan:  Continue with Cardizem, echocardiogram pending, will follow cardiac 

enzymes and EKG


Continue telemetry


No previous history


trop/ck mb pending








Physician Certification


2 Midnight Certification Type:  Admission for Inpatient Services


Order for Inpatient Services


The services are ordered in accordance with Medicare regulations or non-

Medicare payer requirements, as applicable.  In the case of services not 

specified as inpatient-only, they are appropriately provided as inpatient 

services in accordance with the 2-midnight benchmark.


Estimated LOS (days):  4


4 days is the estimated time the patient will need to remain in the hospital, 

assuming treatment plan goals are met and no additional complications.


Post-Hospital Plan:  Lata Lundberg MD Feb 22, 2017 11:13

## 2017-02-22 NOTE — RADHPO
EXAM DATE/TIME:  02/22/2017 15:32 

 

[facility] COMPARISON:     

CHEST SINGLE AP, April 23, 2013, 19:05.

 

                     

INDICATIONS :     

Short of breath.

                     

 

MEDICAL HISTORY :            

Hypertension. Hypercholesterolemia. History of anus carcinoma.   

 

SURGICAL HISTORY :     

Hysterectomy.   

 

ENCOUNTER:     

Initial                                        

 

ACUITY:     

1 day      

 

PAIN SCORE:     

Non-responsive.

 

LOCATION:     

Bilateral chest 

 

FINDINGS:     

A single view of the chest demonstrates the lungs to be symmetrically aerated without evidence of mas
s, infiltrate or effusion.  The cardiomediastinal contours are unremarkable.  Osseous structures are 
intact.

 

CONCLUSION:     No acute disease.  

 

 

 

 Miguel Harmon MD FACR on February 22, 2017 at 15:55           

Board Certified Radiologist.

 This report was verified electronically.

## 2017-02-23 VITALS
RESPIRATION RATE: 16 BRPM | SYSTOLIC BLOOD PRESSURE: 141 MMHG | DIASTOLIC BLOOD PRESSURE: 69 MMHG | HEART RATE: 77 BPM | TEMPERATURE: 97.9 F | OXYGEN SATURATION: 95 %

## 2017-02-23 VITALS
SYSTOLIC BLOOD PRESSURE: 131 MMHG | DIASTOLIC BLOOD PRESSURE: 76 MMHG | RESPIRATION RATE: 18 BRPM | OXYGEN SATURATION: 93 % | TEMPERATURE: 96.7 F | HEART RATE: 68 BPM

## 2017-02-23 VITALS
TEMPERATURE: 98 F | OXYGEN SATURATION: 97 % | RESPIRATION RATE: 18 BRPM | DIASTOLIC BLOOD PRESSURE: 62 MMHG | HEART RATE: 69 BPM | SYSTOLIC BLOOD PRESSURE: 122 MMHG

## 2017-02-23 LAB
BASOPHILS # BLD AUTO: 0.1 TH/MM3 (ref 0–0.2)
BASOPHILS NFR BLD: 0.9 % (ref 0–2)
EOSINOPHIL # BLD: 0.2 TH/MM3 (ref 0–0.4)
EOSINOPHIL NFR BLD: 3.7 % (ref 0–4)
ERYTHROCYTE [DISTWIDTH] IN BLOOD BY AUTOMATED COUNT: 13 % (ref 11.6–17.2)
HCT VFR BLD CALC: 34.8 % (ref 35–46)
HEMO FLAGS: (no result)
LYMPHOCYTES # BLD AUTO: 0.9 TH/MM3 (ref 1–4.8)
LYMPHOCYTES NFR BLD AUTO: 14.6 % (ref 9–44)
MCH RBC QN AUTO: 30.3 PG (ref 27–34)
MCHC RBC AUTO-ENTMCNC: 32.9 % (ref 32–36)
MCV RBC AUTO: 92 FL (ref 80–100)
MONOCYTES NFR BLD: 7.7 % (ref 0–8)
NEUTROPHILS # BLD AUTO: 4.4 TH/MM3 (ref 1.8–7.7)
NEUTROPHILS NFR BLD AUTO: 73.1 % (ref 16–70)
PLATELET # BLD: 187 TH/MM3 (ref 150–450)
RBC # BLD AUTO: 3.78 MIL/MM3 (ref 4–5.3)
WBC # BLD AUTO: 6.2 TH/MM3 (ref 4–11)

## 2017-02-23 RX ADMIN — STANDARDIZED SENNA CONCENTRATE AND DOCUSATE SODIUM SCH TAB: 8.6; 5 TABLET, FILM COATED ORAL at 08:12

## 2017-02-23 RX ADMIN — LEVOTHYROXINE SODIUM SCH MCG: 75 TABLET ORAL at 05:39

## 2017-02-23 RX ADMIN — MULTIPLE VITAMINS W/ MINERALS TAB SCH TAB: TAB at 08:12

## 2017-02-23 RX ADMIN — SODIUM CHLORIDE, PRESERVATIVE FREE SCH ML: 5 INJECTION INTRAVENOUS at 08:14

## 2017-02-23 RX ADMIN — CEFUROXIME AXETIL SCH MG: 500 TABLET ORAL at 08:12

## 2017-02-23 RX ADMIN — PRAVASTATIN SODIUM SCH MG: 20 TABLET ORAL at 08:12

## 2017-02-23 NOTE — MB
cc:

ROXANNA STEPHENS MD

****

 

 

DATE OF CONSULTATION:  02/23/2017

 

REASON FOR CONSULTATION

Newly discovered atrial fibrillation.

 

HISTORY OF PRESENT ILLNESS

The patient is a very pleasant 86-year-old woman who is admitted for noncardiac

reasons of constipation, loose stools in the setting of rectal cancer with

partial removal of the rectum.  While the patient was in the emergency

department she had a

EKG which incidentally showed atrial fibrillation and thus, I was consulted.

 

The patient is currently asymptomatic and is actually back in sinus rhythm by

telemetry. She denies any current palpitations, chest pain, shortness of

breath, lightheadedness or dizziness.

Upon discussing the situation with her she does now realize she was having

intermittent palpitations that she did notice yesterday in the emergency

department while she was in atrial fibrillation but also about once daily over

the last couple of years.  Again, currently she is asymptomatic and hoping to

be discharged home.

 

PAST MEDICAL HISTORY

1.   Hypertension.

2.   Possible TIA.

3.   Colon cancer.

4.   Dyslipidemia.

5.   Hypothyroidism.

 

CURRENT MEDICATIONS

1. Cardizem 60 mg p.o. q.i.d.

2. Lovenox.

3. Ceftin.

4. Plavix 75 mg daily.

5. Pravachol 20 mg daily.

 

ALLERGIES

NO KNOWN DRUG ALLERGIES.

 

PHYSICAL EXAMINATION

VITAL SIGNS: Afebrile, pulse 68, respiratory rate 18, /76, sating 93%.

GENERAL:  A pleasant well-appearing woman, in no distress.

NECK: No JVD.

LUNGS:  Clear to auscultation bilaterally.

CARDIOVASCULAR:  Regular rate and rhythm.  No significant murmurs appreciated.

Perhaps a 1-2/6 flow murmur is heard likely from aortic sclerosis.

ABDOMEN: Abdomen is benign.

EXTREMITIES:  No edema.

 

LABORATORY DATA

White count 6.2, hematocrit 34.8, platelets 187. Sodium 142, potassium 3.7,

chloride ___ bicarb 24.5, BUN 9, creatinine 0.77, cardiac enzymes are negative

x two.

 

EKG

On admission showed a rate-controlled atrial fibrillation at 77 beats per

minute and current telemetry shows sinus rhythm.

 

IMPRESSION

1.   Paroxysmal atrial fibrillation.  The patient has newly discovered

paroxysmal atrial fibrillation.  Her CHADS vas score is at least 4 for age,

gender and hypertension and there is also the possibility that she has had a

TIA which would raise it even further. Thus, I believe she requires full

anticoagulation.  I will change her Plavix to low dose Eliquis due to her low

body weight and advanced age.  She does have a history of rectal bleeding, but

she tells me this is relatively minor even when on  Plavix but her suitability

for anticoagulation will need to be continuously reevaluated based on her past

medical history and any recurrent bleeding.

 

From my standpoint she is clinically stable and all related studies regarding

her atrial fibrillation such as an echo and Holter can be done as an

outpatient.  I will change her Cardizem to long-acting and start Eliquis as

above with the discontinuation of Plavix.

 

Thank you again for the opportunity to participate in this patient's care.

 

                              _________________________________

                              MD SILVANA Barnes/AKANKSHA

D:  2/23/2017/7:44 AM

T:  2/23/2017/7:58 AM

Visit #:  [id number]

Job #:  [id number]

## 2017-02-23 NOTE — EC
Study

 

Study Date:02/23/2017

 

 

 

STUDY CONCLUSIONS

 

SUMMARY

 

- Left ventricle: The cavity size was normal. Systolic function was

normal. The estimated ejection fraction was in the range of 55%

to 60%. Wall motion was normal; there were no regional wall

motion abnormalities. Doppler parameters are consistent with

abnormal left ventricular relaxation (grade 1 diastolic

dysfunction).

- Aortic valve: Mild regurgitation.

- Mitral valve: Mild to moderate regurgitation.

- Tricuspid valve: Moderate regurgitation.

- Pulmonary arteries: PA peak pressure: 31mm Hg (S).

 

-------------------------------------------------------------------

If LV function is below 40, please consider prescribing an ACEI or

ARB or document rationale for non-use.

 

-------------------------------------------------------------------

PROCEDURE DATA

 

STUDY STATUS:

Elective. Procedure: Transthoracic echocardiography.

Image quality was good. Scanning was performed from the

parasternal, apical, and subcostal acoustic windows. Study

completion: The patient tolerated the procedure well.

Transthoracic echocardiography. M-mode, complete 2D, complete

spectral Doppler, and color Doppler. Patient status: Inpatient.

 

-------------------------------------------------------------------

CARDIAC ANATOMY

 

LEFT VENTRICLE:

The cavity size was normal. There was no

hypertrophy. Systolic function was normal. The estimated ejection

fraction was in the range of 55% to 60%. Wall motion was normal;

there were no regional wall motion abnormalities. Doppler

parameters are consistent with abnormal left ventricular relaxation

(grade 1 diastolic dysfunction).

 

AORTIC VALVE:

The valve appears to be grossly normal. Doppler:

There was no stenosis. Mild regurgitation.

 

MITRAL VALVE:

The valve appears to be grossly normal. Doppler:

There was no evidence for stenosis. Mild to moderate regurgitation.

Mean gradient: 2mm Hg (D). Peak gradient: 6mm Hg (D).

 

RIGHT VENTRICLE:

The cavity size was normal.

 

PULMONIC VALVE:

Poorly visualized. Doppler: There was no evidence

for stenosis. Trace regurgitation.

 

TRICUSPID VALVE:

The valve appears to be grossly normal. Doppler:

There was no evidence for stenosis. Moderate regurgitation.

 

PERICARDIUM:

There was no pericardial effusion.

 

-------------------------------------------------------------------

 

BASIC MEASUREMENTS                                      ADULT

Normal

Left ventricle

LV internal dimension, ED, chordal level,   *40.4 mm    43-52

PLAX

LV internal dimension, ES, chordal level,    31.2 mm    23-38

PLAX

Fractional shortening, chordal level, PLAX    *23 %     >29

LV posterior wall thickness, ED              6.05 mm    -----------

IVS/LVPW ratio, ED                          *1.47       <1.3

Ventricular septum

Septal thickness, ED                         8.87 mm    -----------

Aortic valve

Leaflet separation                             19 mm    15-26

Left atrium

Anterior-posterior dimension                   31 mm    -----------

Right ventricle

RV internal dimension, ED, PLAX             *16.6 mm    19-38

 

BASIC MEASUREMENTS                                      ADULT

Normal

Aortic valve

Leaflet separation                             19 mm    15-26

Aorta

Root diameter, ED                              34 mm    20-37

 

DOPPLER MEASUREMENTS                                    ADULT

Normal

Main pulmonary artery

Pressure, S                                   *31 mm Hg =30

Aortic valve

Peak velocity, S                              131 cm/s  -----------

VTI, S                                       37.4 cm    -----------

Mitral valve

Peak E-wave velocity                         95.3 cm/s  -----------

Peak A-wave velocity                          118 cm/s  -----------

Mean velocity, D                             67.4 cm/s  -----------

Mean gradient, D                                2 mm Hg -----------

Peak gradient, D                                6 mm Hg -----------

Peak E/A ratio                                0.8       -----------

Maximal regurgitant velocity                  453 cm/s  -----------

Tricuspid valve

Regurgitant peak velocity                     253 cm/s  -----------

Peak RV-RA gradient, S                         26 mm Hg -----------

Maximal regurgitant velocity                  253 cm/s  -----------

Systemic veins

Estimated CVP                                   5 mm Hg -----------

Right ventricle

RV pressure, S                                *31 mm Hg <30

 

LEGEND:

Mean values are shown as u=mean value.

Asterisk (*) marks values outside specified normal range.

Prepared and signed by

 

Leon Sauceda

[id number]

## 2017-02-23 NOTE — HHI.PR
Subjective


Remarks


Patient seen and evaluated today in follow-up for discharge planning and for 

atrial fibrillation.  Patient now says she's had atrial fibrillation in the 

past and has been followed [facility] with [facility].  Consult appreciated 

today.  Rate is controlled on oral medications and patient has no further 

bleeding.  Negative cultures in the urine cultures are noted





Objective


Vitals





 Vital Signs








  Date Time  Temp Pulse Resp B/P Pulse Ox O2 Delivery O2 Flow Rate FiO2


 


2/23/17 08:00 97.9 66 16 141/69 95   


 


2/23/17 04:00 96.7 68 18 131/76 93   


 


2/23/17 00:00 98.0 69 18 122/62 97   


 


2/22/17 20:00 97.7 62 21 125/65 97   


 


2/22/17 20:00  58      


 


2/22/17 18:16 97.8 66 18 117/58 95   


 


2/22/17 13:12  86  120/84    


 


2/22/17 12:43  92  115/77    


 


2/22/17 12:06  102  108/68    


 


2/22/17 12:00 97.7 80 20 115/80 95   


 


2/22/17 11:46    116/66    








 I/O








 2/22/17 2/22/17 2/22/17 2/23/17 2/23/17 2/23/17





 07:00 15:00 23:00 07:00 15:00 23:00


 


Intake Total   360 ml 240 ml  


 


Balance   360 ml 240 ml  


 


      


 


Intake Oral   360 ml 240 ml  


 


# Voids 6  5 1  


 


# Bowel Movements 3   0  








Result Diagram:  


2/23/17 0600                                                                   

             2/22/17 0705





Objective Remarks


GENERAL: This is a well-nourished, well-developed patient, in no apparent 

distress.


CARDIOVASCULAR: Sinus rhythm without murmurs, gallops, or rubs. 


RESPIRATORY: Clear to auscultation. Breath sounds equal bilaterally. No wheezes

, rales, or rhonchi.  


GASTROINTESTINAL: Abdomen soft, non-tender, nondistended. Normal active bowel 

sounds


MUSCULOSKELETAL: Extremities without clubbing, cyanosis, or edema.


NEURO:  Alert & Oriented x4 to person, place, time, situation.  Moves all ext x4





A/P


Problem List:  


(1) Fecal impaction in rectum


ICD Code:  K56.41


Status:  Resolved


Plan:  Resolved





(2) Rectal bleeding


ICD Code:  K62.5


Status:  Resolved


Plan:  No evidence of bleeding on exam at this time, hemoglobin stable.  

Patient need outpatient flexible sigmoidoscopy per colorectal





(3) UTI (urinary tract infection)


ICD Code:  N39.0


Status:  Acute


Plan:  Empiric antibiotics have been given.  GNR


Cont ceftin





(4) Atrial fibrillation


ICD Code:  I48.91


Status:  Acute


Plan:  Continue with PO Cardizem, eliquis, cardio consult apprecicated





now admits prior episodes





Discharge Planning


Discussed with patient, RN, Son by telephone


Lata Patel MD Feb 23, 2017 10:26

## 2017-02-23 NOTE — HHI.DS
sandro burciaga__________________________________________________





Discharge Summary


Admission Date


Feb 22, 2017 at 11:07


Discharge Date:  Feb 23, 2017


Admitting Diagnosis


rectal bleed, rectal pain, constipation, fecal impaction, rectal can





(1) Fecal impaction in rectum


ICD Code:  K56.41


(2) Rectal bleeding


ICD Code:  K62.5


(3) UTI (urinary tract infection)


ICD Code:  N39.0


Diagnosis:  Principal





(4) Atrial fibrillation


ICD Code:  I48.91


Diagnosis:  Principal





Procedures


echo pending


Brief History - From Admission


This is a pleasant 86-year-old  female with past medical history of 

rectal cancer status post partial removal of her rectum years ago with Dr. Meese who presents with a five-day history of constipation.  The patient states 

that over the past year she has had cycles of loose stools followed by 

constipation.  She is complaining of some rectal pain.  Apparently she had a 

small amount of bright red blood from her rectum last night.  The patient had 

abdominal x-ray in the emergency department showing normal gas pattern.  Guaiac 

was positive for blood per rectum and she was noted to have a fecal impaction 

on rectal exam.  The patient was given magnesium citrate and this morning is 

having copious loose bowel movements.  She states that she still has pain in 

the rectum as well as some mild abdominal cramping prior to bowel movements but 

denies any further bleeding.  Hemoglobin is stable this morning.  The patient 

denies any recent weight loss, abdominal pain nausea or vomiting.


CBC/BMP:  


2/23/17 0600                                                                   

             2/22/17 0705





Significant Findings





Laboratory Tests








Test 2/21/17 2/21/17 2/22/17 2/23/17





 05:40 15:20 07:05 06:00


 


Red Blood Count 3.79 MIL/MM3   3.78 MIL/MM3





 (4.00-5.30)   (4.00-5.30)


 


Neutrophils (%) (Auto) 85.1 %  78.4 % 73.1 %





 (16.0-70.0)  (16.0-70.0) (16.0-70.0)


 


Lymphocytes (%) (Auto) 7.7 %   





 (9.0-44.0)   


 


Neutrophils # (Auto) 8.4 TH/MM3   





 (1.8-7.7)   


 


Lymphocytes # (Auto) 0.8 TH/MM3   0.9 TH/MM3





 (1.0-4.8)   (1.0-4.8)


 


Potassium Level 3.2 MEQ/L   





 (3.5-5.1)   


 


Chloride Level 110 MEQ/L   





 ()   


 


Estimat Glomerular Filtration 77 ML/MIN (>89)  71 ML/MIN (>89) 





Rate    


 


Calcium Level 8.4 MG/DL   





 (8.5-10.1)   


 


Urine Turbidity  CLOUDY  (CLEAR)  


 


Urine Protein  100 mg/dL  





  (NEG-TRACE)  


 


Urine Occult Blood  LARGE  (NEG)  


 


Urine Leukocyte Esterase  LARGE  (NEG)  


 


Urine RBC  4-9 /hpf (0-3)  


 


Urine WBC  INNUM /hpf  





  (0-5)  


 


Urine Bacteria  MANY /hpf  





  (NONE)  


 


Hemoglobin    11.4 GM/DL





    (11.6-15.3)


 


Hematocrit    34.8 %





    (35.0-46.0)








Imaging





Last Impressions








Chest X-Ray 2/22/17 0000 Signed





Impressions: 





 Service Date/Time:  Wednesday, February 22, 2017 15:32 - CONCLUSION: No acute 





 disease.       Miguel Harmon MD  FACR


 


Abdomen X-Ray 2/20/17 0431 Signed





Impressions: 





 Service Date/Time:  Monday, February 20, 2017 04:47 - CONCLUSION:  Negative 





 abdominal series. An acute abnormality is not seen. There is chronic change 





 described above.     Taye Coyle MD 








PE at Discharge


GENERAL: This is a well-nourished, well-developed patient, in no apparent 

distress.


CARDIOVASCULAR: Sinus rhythm without murmurs, gallops, or rubs. 


RESPIRATORY: Clear to auscultation. Breath sounds equal bilaterally. No wheezes

, rales, or rhonchi.  


GASTROINTESTINAL: Abdomen soft, non-tender, nondistended. Normal active bowel 

sounds


MUSCULOSKELETAL: Extremities without clubbing, cyanosis, or edema.


NEURO:  Alert & Oriented x4 to person, place, time, situation.  Moves all ext x4


Pt update on day of discharge


See daily progress note


Hospital Course


Patient 86 her old female was admitted to the hospital for rectal pain and 

rectal bleeding.  She was seen by colorectal surgeon.  No significant bleeding 

was noted patient had normal counts throughout her hospitalization.  Regulation 

of her for outpatient flexible sigmoidoscopy and stool softener.  Patient also 

had good relief of fecal impaction with bowel regimen.  Patient also developed 

a urinary tract infection prior to admission this was found and treated with 

antibiotics here.  During her hospitalization she was noted to have atrial 

fibrillation which apparently has a recurrence of a very old problem.  She was 

seen by cardiology recommended to start Eliquis in lieu of her Plavix and 

Cardizem was started with good results.  Patient was in sinus rhythm at the 

time of discharge


Pt Condition on Discharge:  Stable


Discharge Disposition:  Disch w/ Home Health Serv


Discharge Time:  > 30 minutes


Discharge Instructions


DIET: Follow Instructions for:  As Tolerated, No Restrictions


Activities you can perform:  Regular-No Restrictions


Follow up Referrals:  


Colorectal Surgery - 2 Weeks with Meese,David L. MD


PCP Follow-up - 2-3 Days with Jovany Ugarte MD


[facility]/ with [facility] at Home





New Medications:  


Cefuroxime (Ceftin) 500 Mg Tab


500 MG PO BID Infection #14 Ref 0 TAB


Magnesium Hydroxide Liq (Milk of Magnesia Liq) 400 Mg/5 Ml Susp


30 ML PO DAILY PRN Bowel Management #30 Ref 1 BOTTLE


Apixaban (Eliquis) 2.5 Mg Tab


2.5 MG PO BID afib #31 TAB


Diltiazem CD 24 HR (Cardizem CD 24 HR) 240 Mg Caper


240 MG PO DAILY afib #31 CAP


 


Continued Medications:  


Albuterol 18 GM Inh (Ventolin Hfa 18 GM Inh) 90 Mcg/Act Aer


2 PUFF INH Q4-6H PRN SHORTNESS OF BREATH #1 Ref 0 INHALER


Amlodipine (Amlodipine) 5 Mg Tab


5 MG PO DAILY Blood Pressure Management #30 Ref 0 TAB


Ascorbic Acid (Vitamin C) 500 Mg Cap


500 MG PO Nutritional Supplement Ref 0 CAP


Levothyroxine (Levothyroxine) 75 Mcg Tab


75 MCG PO DAILY Thyroid #30 Ref 0 TAB


Multiple Vitamins W/ Minerals (Centrum) 1 Tab


1 TAB PO DAILY Nutritional Supplement Ref 0 TAB


Pravastatin (Pravastatin) 20 Mg Tab


20 MG PO DAILY Cholesterol Management #30 Ref 0 TAB


 


Discontinued Medications:  


Clopidogrel (Plavix) 75 Mg Tab


75 MG PO DAILY Blood Clot Prevention #30 Ref 0 TAB











Lata Roque MD Feb 23, 2017 10:30

## 2018-05-11 ENCOUNTER — HOSPITAL ENCOUNTER (OUTPATIENT)
Dept: HOSPITAL 17 - PHED | Age: 83
Setting detail: OBSERVATION
LOS: 4 days | Discharge: HOME HEALTH | End: 2018-05-15
Attending: HOSPITALIST | Admitting: HOSPITALIST
Payer: COMMERCIAL

## 2018-05-11 VITALS
TEMPERATURE: 98.9 F | OXYGEN SATURATION: 96 % | DIASTOLIC BLOOD PRESSURE: 79 MMHG | HEART RATE: 76 BPM | SYSTOLIC BLOOD PRESSURE: 197 MMHG | RESPIRATION RATE: 18 BRPM

## 2018-05-11 VITALS
DIASTOLIC BLOOD PRESSURE: 67 MMHG | HEART RATE: 70 BPM | RESPIRATION RATE: 18 BRPM | SYSTOLIC BLOOD PRESSURE: 177 MMHG | OXYGEN SATURATION: 96 %

## 2018-05-11 VITALS — HEIGHT: 62 IN | WEIGHT: 86.42 LBS | BODY MASS INDEX: 15.9 KG/M2

## 2018-05-11 VITALS
OXYGEN SATURATION: 96 % | DIASTOLIC BLOOD PRESSURE: 62 MMHG | RESPIRATION RATE: 16 BRPM | SYSTOLIC BLOOD PRESSURE: 164 MMHG | HEART RATE: 73 BPM

## 2018-05-11 DIAGNOSIS — Z85.048: ICD-10-CM

## 2018-05-11 DIAGNOSIS — D64.9: ICD-10-CM

## 2018-05-11 DIAGNOSIS — K58.9: ICD-10-CM

## 2018-05-11 DIAGNOSIS — E03.9: ICD-10-CM

## 2018-05-11 DIAGNOSIS — K57.30: ICD-10-CM

## 2018-05-11 DIAGNOSIS — Z79.899: ICD-10-CM

## 2018-05-11 DIAGNOSIS — K21.9: ICD-10-CM

## 2018-05-11 DIAGNOSIS — Z86.718: ICD-10-CM

## 2018-05-11 DIAGNOSIS — R94.31: ICD-10-CM

## 2018-05-11 DIAGNOSIS — I48.91: ICD-10-CM

## 2018-05-11 DIAGNOSIS — I44.0: ICD-10-CM

## 2018-05-11 DIAGNOSIS — R63.4: ICD-10-CM

## 2018-05-11 DIAGNOSIS — I10: ICD-10-CM

## 2018-05-11 DIAGNOSIS — N85.8: ICD-10-CM

## 2018-05-11 DIAGNOSIS — E46: ICD-10-CM

## 2018-05-11 DIAGNOSIS — M19.90: ICD-10-CM

## 2018-05-11 DIAGNOSIS — K62.5: Primary | ICD-10-CM

## 2018-05-11 DIAGNOSIS — K64.8: ICD-10-CM

## 2018-05-11 DIAGNOSIS — R19.7: ICD-10-CM

## 2018-05-11 DIAGNOSIS — N28.9: ICD-10-CM

## 2018-05-11 DIAGNOSIS — H91.90: ICD-10-CM

## 2018-05-11 DIAGNOSIS — K64.4: ICD-10-CM

## 2018-05-11 LAB
ALBUMIN SERPL-MCNC: 3.4 GM/DL (ref 3.4–5)
ALP SERPL-CCNC: 97 U/L (ref 45–117)
ALT SERPL-CCNC: 19 U/L (ref 10–53)
AST SERPL-CCNC: 19 U/L (ref 15–37)
BASOPHILS # BLD AUTO: 0.1 TH/MM3 (ref 0–0.2)
BASOPHILS NFR BLD: 1.9 % (ref 0–2)
BILIRUB SERPL-MCNC: 0.2 MG/DL (ref 0.2–1)
BUN SERPL-MCNC: 26 MG/DL (ref 7–18)
CALCIUM SERPL-MCNC: 9 MG/DL (ref 8.5–10.1)
CHLORIDE SERPL-SCNC: 109 MEQ/L (ref 98–107)
CREAT SERPL-MCNC: 0.98 MG/DL (ref 0.5–1)
EOSINOPHIL # BLD: 0.1 TH/MM3 (ref 0–0.4)
EOSINOPHIL NFR BLD: 2.7 % (ref 0–4)
ERYTHROCYTE [DISTWIDTH] IN BLOOD BY AUTOMATED COUNT: 20.1 % (ref 11.6–17.2)
GFR SERPLBLD BASED ON 1.73 SQ M-ARVRAT: 54 ML/MIN (ref 89–?)
GLUCOSE SERPL-MCNC: 124 MG/DL (ref 74–106)
HCO3 BLD-SCNC: 29.1 MEQ/L (ref 21–32)
HCT VFR BLD CALC: 30.8 % (ref 35–46)
HGB BLD-MCNC: 10.6 GM/DL (ref 11.6–15.3)
INR PPP: 1 RATIO
LYMPHOCYTES # BLD AUTO: 0.8 TH/MM3 (ref 1–4.8)
LYMPHOCYTES NFR BLD AUTO: 17.9 % (ref 9–44)
MCH RBC QN AUTO: 30.5 PG (ref 27–34)
MCHC RBC AUTO-ENTMCNC: 34.5 % (ref 32–36)
MCV RBC AUTO: 88.6 FL (ref 80–100)
MONOCYTE #: 0.4 TH/MM3 (ref 0–0.9)
MONOCYTES NFR BLD: 9 % (ref 0–8)
NEUTROPHILS # BLD AUTO: 3.2 TH/MM3 (ref 1.8–7.7)
NEUTROPHILS NFR BLD AUTO: 68.5 % (ref 16–70)
PLATELET # BLD: 161 TH/MM3 (ref 150–450)
PMV BLD AUTO: 10 FL (ref 7–11)
PROT SERPL-MCNC: 6.7 GM/DL (ref 6.4–8.2)
PROTHROMBIN TIME: 10.3 SEC (ref 9.8–11.6)
RBC # BLD AUTO: 3.48 MIL/MM3 (ref 4–5.3)
SODIUM SERPL-SCNC: 143 MEQ/L (ref 136–145)
WBC # BLD AUTO: 4.6 TH/MM3 (ref 4–11)

## 2018-05-11 PROCEDURE — 86900 BLOOD TYPING SEROLOGIC ABO: CPT

## 2018-05-11 PROCEDURE — 97162 PT EVAL MOD COMPLEX 30 MIN: CPT

## 2018-05-11 PROCEDURE — 97167 OT EVAL HIGH COMPLEX 60 MIN: CPT

## 2018-05-11 PROCEDURE — 97110 THERAPEUTIC EXERCISES: CPT

## 2018-05-11 PROCEDURE — 85014 HEMATOCRIT: CPT

## 2018-05-11 PROCEDURE — 80053 COMPREHEN METABOLIC PANEL: CPT

## 2018-05-11 PROCEDURE — 86850 RBC ANTIBODY SCREEN: CPT

## 2018-05-11 PROCEDURE — G0378 HOSPITAL OBSERVATION PER HR: HCPCS

## 2018-05-11 PROCEDURE — 97116 GAIT TRAINING THERAPY: CPT

## 2018-05-11 PROCEDURE — 99285 EMERGENCY DEPT VISIT HI MDM: CPT

## 2018-05-11 PROCEDURE — C9113 INJ PANTOPRAZOLE SODIUM, VIA: HCPCS

## 2018-05-11 PROCEDURE — 00811 ANES LWR INTST NDSC NOS: CPT

## 2018-05-11 PROCEDURE — 86901 BLOOD TYPING SEROLOGIC RH(D): CPT

## 2018-05-11 PROCEDURE — 85025 COMPLETE CBC W/AUTO DIFF WBC: CPT

## 2018-05-11 PROCEDURE — 74177 CT ABD & PELVIS W/CONTRAST: CPT

## 2018-05-11 PROCEDURE — 85610 PROTHROMBIN TIME: CPT

## 2018-05-11 PROCEDURE — 96374 THER/PROPH/DIAG INJ IV PUSH: CPT

## 2018-05-11 PROCEDURE — 85730 THROMBOPLASTIN TIME PARTIAL: CPT

## 2018-05-11 PROCEDURE — 83690 ASSAY OF LIPASE: CPT

## 2018-05-11 PROCEDURE — 93005 ELECTROCARDIOGRAM TRACING: CPT

## 2018-05-11 PROCEDURE — 45378 DIAGNOSTIC COLONOSCOPY: CPT

## 2018-05-11 PROCEDURE — 85018 HEMOGLOBIN: CPT

## 2018-05-11 PROCEDURE — 96361 HYDRATE IV INFUSION ADD-ON: CPT

## 2018-05-11 RX ADMIN — Medication PRN ML: at 23:28

## 2018-05-11 NOTE — PD
HPI


Chief Complaint:  Bleeding


Time Seen by Provider:  18:38


Travel History


International Travel<30 days:  No


Contact w/Intl Traveler<30days:  No


Traveled to known affect area:  No





History of Present Illness


HPI


Patient comes in complaining of intermittent  2-3 week history of bright red 

blood per rectum.  However patient states that she had one episode yesterday 

and 2 episodes today with large amounts of blood clot without any normal stool 

in the mix.  Son is at the bedside corroborating the patient's history, per 

patient also she does not feel any abdominal pain or back pain or flank pain 

associated with these large bloody clots movement.  Patient denies any 

alleviating or aggravating factors.  Patient denies any associated factors such 

as fever, nausea, vomiting, diarrhea, abdominal pain, chest pain, back pain, 

flank pain, runny nose/cough/sore throat.








PCP DR EMERY LUCAS


No known drug allergy


Past medical history significant for hypothyroidism, tonsillectomy, corrective 

lens wearing, leg blood clots/DVTs, used to be on Eliquis which according to 

her she has already stopped since February 2018 , hypercholesterolemia, 

hypertension, appendectomy cholecystectomy, hysterectomy, rectal cancer about 

17 years ago, renal stent, renal insufficiency,





PFSH


Past Medical History


Hx Anticoagulant Therapy:  Yes (STOPPED SEVERAL WEEKS AGO)


Arthritis:  Yes


Autoimmune Disease:  No


Blood Disorders:  No


Anxiety:  No


Depression:  No


Cancer:  Yes (hx rectal ca 17 years ago)


Cardiovascular Problems:  Yes (BLOOD CLOTS LEGS)


High Cholesterol:  Yes


Chemotherapy:  Yes (1996)


Chest Pain:  No


Congestive Heart Failure:  No


Diminished Hearing:  Yes (NOT WITH PT THIS VISIT 2/20/17)


Deep Vein Thrombosis:  Yes (R GROIN )


Endocrine:  Yes (hypothyroid)


Gastrointestinal Disorders:  Yes


GERD:  Yes


Genitourinary:  Yes (STENT LEFT KIDNEY)


Hypertension:  Yes


Immune Disorder:  No


Implanted Vascular Access Dvce:  Yes


Musculoskeletal:  Yes


Neurologic:  No


Psychiatric:  No


Reproductive:  No


Respiratory:  No


Radiation Therapy:  Yes (1996)


Sickle Cell Disease:  No


Thyroid Disease:  Yes (HYPO)


Menopausal:  Yes





Past Surgical History


Abdominal Surgery:  Yes (APPY, CHOLEY)


Appendectomy:  Yes


Body Medical Devices:  RENAL STENT


Cardiac Surgery:  No


Cholecystectomy:  Yes


Genitourinary Surgery:  Yes (RENAL STENT, RECTAL CANCER)


Gynecologic Surgery:  No


Hysterectomy:  Yes


Thoracic Surgery:  No


Tonsillectomy:  Yes


Other Surgery:  Yes





Social History


Alcohol Use:  No


Tobacco Use:  No (quit 30 years ago)


Substance Use:  No





Allergies-Medications


(Allergen,Severity, Reaction):  


Coded Allergies:  


     No Known Allergies (Verified  Allergy, Unknown, 5/11/18)


Reported Meds & Prescriptions





Reported Meds & Active Scripts


Active


Reported


Ferrous Fumarate 324 Mg (106 Mg Iron) Tab 325 Mg PO TID


Myrbetriq (Mirabegron) 50 Mg Tab 50 Mg PO DAILY


Gabapentin 100 Mg Cap 100 Mg PO HS


Escitalopram (Escitalopram Oxalate) 10 Mg Tab 10 Mg PO DAILY


Carvedilol 6.25 Mg Tab 6.25 Mg PO BID


Atorvastatin (Atorvastatin Calcium) 10 Mg Tab 10 Mg PO HS


Levothyroxine (Levothyroxine Sodium) 75 Mcg Tab 75 Mcg PO DAILY








Review of Systems


General / Constitutional:  No: Fever


Eyes:  No: Visual changes


HENT:  No: Headaches


Cardiovascular:  No: Chest Pain or Discomfort


Respiratory:  No: Shortness of Breath


Gastrointestinal:  Positive: Hematochezia


Genitourinary:  No: Dysuria


Musculoskeletal:  No: Pain


Skin:  No Rash


Neurologic:  No: Weakness


Psychiatric:  No: Depression


Endocrine:  No: Polydipsia


Hematologic/Lymphatic:  No: Easy Bruising





Physical Exam


Narrative


GENERAL: 


SKIN: Warm and dry.


HEAD: Atraumatic. Normocephalic. 


EYES: Pupils equal and round. No scleral icterus. No injection or drainage. 


ENT: No nasal bleeding or discharge.  Mucous membranes pink and moist.


NECK: Trachea midline. No JVD. 


CARDIOVASCULAR: Regular rate and rhythm.  


RESPIRATORY: No accessory muscle use. Clear to auscultation. Breath sounds 

equal bilaterally. 


GASTROINTESTINAL: Abdomen soft, non-tender, nondistended.  RN at bedside during 

examination.  No evidence of any external hemorrhoids or rectal fissures.  The 

patient had large blood clots in her diaper (approx 200ml of clots only on her 

diaper), as well as during the examination


MUSCULOSKELETAL: Extremities without clubbing, cyanosis, or edema. No obvious 

deformities. 


NEUROLOGICAL: Awake and alert. No obvious cranial nerve deficits.  Motor 

grossly within normal limits. Five out of 5 muscle strength in the arms and 

legs.  Normal speech.


PSYCHIATRIC: Appropriate mood and affect; insight and judgment normal.





Data


Data


Last Documented VS





Vital Signs








  Date Time  Temp Pulse Resp B/P (MAP) Pulse Ox O2 Delivery O2 Flow Rate FiO2


 


5/11/18 19:10  73 16 164/62 (96) 96 Room Air  


 


5/11/18 18:23 98.9       








Orders





 Orders


Complete Blood Count With Diff (5/11/18 18:38)


Comprehensive Metabolic Panel (5/11/18 18:38)


Lipase (5/11/18 18:38)


Prothrombin Time / Inr (Pt) (5/11/18 18:38)


Act Partial Throm Time (Ptt) (5/11/18 18:38)


Ecg Monitoring (5/11/18 18:38)


Iv Access Insert/Monitor (5/11/18 18:38)


Oximetry (5/11/18 18:38)


Sodium Chloride 0.9% Flush (Ns Flush) (5/11/18 18:45)


Type And Screen (5/11/18 19:19)


Admit Order (Ed Use Only) (5/11/18 19:41)





Labs





Laboratory Tests








Test


  5/11/18


19:05


 


White Blood Count 4.6 TH/MM3 


 


Red Blood Count 3.48 MIL/MM3 


 


Hemoglobin 10.6 GM/DL 


 


Hematocrit 30.8 % 


 


Mean Corpuscular Volume 88.6 FL 


 


Mean Corpuscular Hemoglobin 30.5 PG 


 


Mean Corpuscular Hemoglobin


Concent 34.5 % 


 


 


Red Cell Distribution Width 20.1 % 


 


Platelet Count 161 TH/MM3 


 


Mean Platelet Volume 10.0 FL 


 


Neutrophils (%) (Auto) 68.5 % 


 


Lymphocytes (%) (Auto) 17.9 % 


 


Monocytes (%) (Auto) 9.0 % 


 


Eosinophils (%) (Auto) 2.7 % 


 


Basophils (%) (Auto) 1.9 % 


 


Neutrophils # (Auto) 3.2 TH/MM3 


 


Lymphocytes # (Auto) 0.8 TH/MM3 


 


Monocytes # (Auto) 0.4 TH/MM3 


 


Eosinophils # (Auto) 0.1 TH/MM3 


 


Basophils # (Auto) 0.1 TH/MM3 


 


CBC Comment DIFF FINAL 


 


Differential Comment  


 


Prothrombin Time 10.3 SEC 


 


Prothromb Time International


Ratio 1.0 RATIO 


 


 


Activated Partial


Thromboplast Time 26.0 SEC 


 


 


Blood Urea Nitrogen 26 MG/DL 


 


Creatinine 0.98 MG/DL 


 


Random Glucose 124 MG/DL 


 


Total Protein 6.7 GM/DL 


 


Albumin 3.4 GM/DL 


 


Calcium Level 9.0 MG/DL 


 


Alkaline Phosphatase 97 U/L 


 


Aspartate Amino Transf


(AST/SGOT) 19 U/L 


 


 


Alanine Aminotransferase


(ALT/SGPT) 19 U/L 


 


 


Total Bilirubin 0.2 MG/DL 


 


Sodium Level 143 MEQ/L 


 


Potassium Level 3.6 MEQ/L 


 


Chloride Level 109 MEQ/L 


 


Carbon Dioxide Level 29.1 MEQ/L 


 


Anion Gap 5 MEQ/L 


 


Estimat Glomerular Filtration


Rate 54 ML/MIN 


 


 


Lipase 328 U/L 











[facility]


Medical Decision Making


Medical Screen Exam Complete:  Yes


Emergency Medical Condition:  Yes


Medical Record Reviewed:  Yes


Differential Diagnosis


Rectal bleeding versus GI bleed versus rectal fissure versus external 

hemorrhoids


Narrative Course


CBC shows no leukocytosis, no left shift, normal platelet count, and mild 

anemia of 10/30


Coagulation profile is within normal limits


Electrolytes are all within normal limits, normal kidney liver and pancreatic 

functions


Blood bank B+





Diagnosis





 Primary Impression:  


 large rectal bleeding





Admitting Information


Admitting Physician Requests:  Observation











Canelo Capone MD May 11, 2018 18:44

## 2018-05-12 VITALS
OXYGEN SATURATION: 94 % | SYSTOLIC BLOOD PRESSURE: 186 MMHG | RESPIRATION RATE: 20 BRPM | HEART RATE: 67 BPM | DIASTOLIC BLOOD PRESSURE: 82 MMHG | TEMPERATURE: 96.2 F

## 2018-05-12 VITALS
OXYGEN SATURATION: 95 % | RESPIRATION RATE: 19 BRPM | TEMPERATURE: 97.2 F | DIASTOLIC BLOOD PRESSURE: 87 MMHG | HEART RATE: 76 BPM | SYSTOLIC BLOOD PRESSURE: 192 MMHG

## 2018-05-12 VITALS
SYSTOLIC BLOOD PRESSURE: 165 MMHG | TEMPERATURE: 96.6 F | HEART RATE: 69 BPM | RESPIRATION RATE: 15 BRPM | DIASTOLIC BLOOD PRESSURE: 74 MMHG | OXYGEN SATURATION: 95 %

## 2018-05-12 VITALS
DIASTOLIC BLOOD PRESSURE: 66 MMHG | HEART RATE: 70 BPM | RESPIRATION RATE: 20 BRPM | OXYGEN SATURATION: 95 % | TEMPERATURE: 96.9 F | SYSTOLIC BLOOD PRESSURE: 150 MMHG

## 2018-05-12 VITALS
RESPIRATION RATE: 20 BRPM | TEMPERATURE: 97.8 F | OXYGEN SATURATION: 95 % | SYSTOLIC BLOOD PRESSURE: 187 MMHG | HEART RATE: 72 BPM | DIASTOLIC BLOOD PRESSURE: 76 MMHG

## 2018-05-12 LAB
ALBUMIN SERPL-MCNC: 3.4 GM/DL (ref 3.4–5)
ALP SERPL-CCNC: 78 U/L (ref 45–117)
ALT SERPL-CCNC: 17 U/L (ref 10–53)
AST SERPL-CCNC: 16 U/L (ref 15–37)
BILIRUB SERPL-MCNC: 0.4 MG/DL (ref 0.2–1)
BUN SERPL-MCNC: 17 MG/DL (ref 7–18)
CALCIUM SERPL-MCNC: 9.1 MG/DL (ref 8.5–10.1)
CHLORIDE SERPL-SCNC: 111 MEQ/L (ref 98–107)
CREAT SERPL-MCNC: 0.72 MG/DL (ref 0.5–1)
GFR SERPLBLD BASED ON 1.73 SQ M-ARVRAT: 76 ML/MIN (ref 89–?)
GLUCOSE SERPL-MCNC: 82 MG/DL (ref 74–106)
HCO3 BLD-SCNC: 28.3 MEQ/L (ref 21–32)
HCT VFR BLD CALC: 33.3 % (ref 35–46)
HGB BLD-MCNC: 10.7 GM/DL (ref 11.6–15.3)
PROT SERPL-MCNC: 6.4 GM/DL (ref 6.4–8.2)
SODIUM SERPL-SCNC: 144 MEQ/L (ref 136–145)

## 2018-05-12 RX ADMIN — ESCITALOPRAM OXALATE SCH MG: 10 TABLET, FILM COATED ORAL at 13:14

## 2018-05-12 RX ADMIN — CARVEDILOL SCH MG: 6.25 TABLET, FILM COATED ORAL at 13:13

## 2018-05-12 RX ADMIN — THIAMINE HYDROCHLORIDE SCH MLS/HR: 100 INJECTION, SOLUTION INTRAMUSCULAR; INTRAVENOUS at 13:29

## 2018-05-12 RX ADMIN — PANTOPRAZOLE SCH MG: 40 TABLET, DELAYED RELEASE ORAL at 21:32

## 2018-05-12 RX ADMIN — LEVOTHYROXINE SODIUM SCH MCG: 75 TABLET ORAL at 13:14

## 2018-05-12 RX ADMIN — Medication SCH ML: at 21:32

## 2018-05-12 RX ADMIN — CARVEDILOL SCH MG: 6.25 TABLET, FILM COATED ORAL at 21:32

## 2018-05-12 RX ADMIN — THIAMINE HYDROCHLORIDE SCH MLS/HR: 100 INJECTION, SOLUTION INTRAMUSCULAR; INTRAVENOUS at 00:04

## 2018-05-12 RX ADMIN — Medication SCH ML: at 08:54

## 2018-05-12 RX ADMIN — Medication SCH MG: at 17:32

## 2018-05-12 RX ADMIN — PANTOPRAZOLE SCH MG: 40 TABLET, DELAYED RELEASE ORAL at 13:14

## 2018-05-12 RX ADMIN — Medication SCH MG: at 13:13

## 2018-05-12 RX ADMIN — TOLTERODINE TARTRATE SCH MG: 4 CAPSULE, EXTENDED RELEASE ORAL at 13:00

## 2018-05-12 RX ADMIN — ATORVASTATIN CALCIUM SCH MG: 10 TABLET, FILM COATED ORAL at 21:32

## 2018-05-12 RX ADMIN — GABAPENTIN SCH MG: 100 CAPSULE ORAL at 21:32

## 2018-05-12 NOTE — RADRPT
EXAM DATE/TIME:  05/12/2018 19:29 

 

[facility] COMPARISON:     

No previous studies available for comparison.

 

 

INDICATIONS :     

Blood in stool.

                      

 

IV CONTRAST:     

75 cc Omnipaque 350 (iohexol) IV 

 

 

ORAL CONTRAST:      

Prescribed oral contrast ingested.

                      

 

RADIATION DOSE:     

4.74 CTDIvol (mGy) 

 

 

MEDICAL HISTORY :     

Hypothyroidism. Deep venous thrombosis. Carcinoma, rectal.hypertension.

 

SURGICAL HISTORY :      

Hysterectomy. Appendectomy.Cholecystectomy.Right renal stent.

 

ENCOUNTER:      

Initial

 

ACUITY:      

2 weeks

 

PAIN SCALE:      

0/10

 

LOCATION:        

pelvis 

 

TECHNIQUE:     

Volumetric scanning of the abdomen and pelvis was performed.  Using automated exposure control and ad
justment of the mA and/or kV according to patient size, radiation dose was kept as low as reasonably 
achievable to obtain optimal diagnostic quality images.  DICOM format image data is available electro
nically for review and comparison.  

 

FINDINGS:     

 

LOWER LUNGS:     

The visualized lower lungs are clear.

 

LIVER:     

Homogeneous density without lesion.  There is mild dilation of the biliary tree.  No gallbladder, kenny
gically removed..

 

SPLEEN:     

Normal size without lesion.

 

PANCREAS:     

Within normal limits.

 

KIDNEYS:     

Normal in size and shape.  There is no mass, stone or hydronephrosis.

 

ADRENAL GLANDS:     

Within normal limits.

 

VASCULAR:     

There is no aortic aneurysm.

 

BOWEL/MESENTERY:     

The stomach, small bowel, and colon demonstrate no acute abnormality.  There is no free intraperitone
al air or fluid. There is scattered diverticulosis of the descending and sigmoid colon without inflam
matory changes. There is stool throughout the colon.

 

ABDOMINAL WALL:     

Within normal limits.

 

RETROPERITONEUM:     

There is no lymphadenopathy. 

 

BLADDER:     

No wall thickening or mass. 

 

REPRODUCTIVE:     

There is a well-defined left adnexal cyst measuring 4 cm x 3.7 cm. No definite pelvic masses. No free
 fluid.

 

INGUINAL:     

There is no lymphadenopathy or hernia. 

 

MUSCULOSKELETAL:     

Diffuse primary bony degenerative changes. A scoliosis with curvature of the lumbar spine to the righ
t.

 

CONCLUSION:     

1. Status post cholecystectomy with some mild dilatation of the biliary system. This is most likely r
eservoir effect. Recommend correlation with liver laboratory values.

2. Scattered diverticulosis of the descending and sigmoid colon without inflammatory changes.

3. Left adnexal cyst measuring 4.0 cm.

4. Diffuse primary degenerative changes of the lumbar spine and pelvis.

 

 

 

 Gaurav Harrison MD on May 12, 2018 at 19:54           

Board Certified Radiologist.

 This report was verified electronically.

## 2018-05-12 NOTE — PD.CONS
HPI


History of Present Illness


This is a 88 year old female who was admitted to the hospital with history of 

rectal bleeding.  Limited history available from the patient who is a poor 

historian.  No family by her bedside.  She describes having bright red blood 

per rectum associated with loose stools off and on for several months.  She 

also reports anorexia and weight loss.  She denies any heartburn dysphagia 

nausea vomiting hematemesis melena jaundice ascites edema.





PFSH


Past Medical History


Hypothyroidism


Atrial fibrillation


Renal insufficiency


History of DVT


Past Surgical History


Colon/rectal resection


Renal stent, left


Ectomy


Hysterectomy


Coded Allergies:  


     No Known Allergies (Verified  Allergy, Unknown, 18)


Medications


As per the nursing notes


Family History


Mother  of pancreatic cancer, father had a brain bleed


Social History


Lives with her , no tobacco or alcohol dependency





Review of Systems


Constitutional:  COMPLAINS OF: Weight loss


Gastrointestinal:  COMPLAINS OF: Bloody stools, Diarrhea, Anorexia





GI Exam


Vitals I&O





Vital Signs








  Date Time  Temp Pulse Resp B/P (MAP) Pulse Ox O2 Delivery O2 Flow Rate FiO2


 


18 12:00 97.8 72 20 187/76 (113) 95   


 


18 07:59 97.2 76 19 192/87 (122) 95   


 


18 01:18 96.2 67 20 186/82 (116) 94   


 


18 20:40        


 


18 20:35  70 18 177/67 (103) 96 Room Air  


 


18 19:10  73 16 164/62 (96) 96 Room Air  


 


18 18:23 98.9 76 18 197/79 (118) 96   














I/O      


 


 18





 07:00 15:00 23:00 07:00 15:00 23:00


 


Intake Total     450 ml 


 


Balance     450 ml 


 


      


 


Intake Oral     0 ml 


 


IV Total     450 ml 


 


# Voids    3  


 


# Bowel Movements   1   








Laboratory











Test


  18


19:05 18


06:14


 


White Blood Count 4.6 TH/MM3  


 


Red Blood Count 3.48 MIL/MM3  


 


Hemoglobin 10.6 GM/DL  10.7 GM/DL 


 


Hematocrit 30.8 %  33.3 % 


 


Mean Corpuscular Volume 88.6 FL  


 


Mean Corpuscular Hemoglobin 30.5 PG  


 


Mean Corpuscular Hemoglobin


Concent 34.5 % 


  


 


 


Red Cell Distribution Width 20.1 %  


 


Platelet Count 161 TH/MM3  


 


Mean Platelet Volume 10.0 FL  


 


Neutrophils (%) (Auto) 68.5 %  


 


Lymphocytes (%) (Auto) 17.9 %  


 


Monocytes (%) (Auto) 9.0 %  


 


Eosinophils (%) (Auto) 2.7 %  


 


Basophils (%) (Auto) 1.9 %  


 


Neutrophils # (Auto) 3.2 TH/MM3  


 


Lymphocytes # (Auto) 0.8 TH/MM3  


 


Monocytes # (Auto) 0.4 TH/MM3  


 


Eosinophils # (Auto) 0.1 TH/MM3  


 


Basophils # (Auto) 0.1 TH/MM3  


 


CBC Comment DIFF FINAL  


 


Differential Comment   


 


Prothrombin Time 10.3 SEC  


 


Prothromb Time International


Ratio 1.0 RATIO 


  


 


 


Activated Partial


Thromboplast Time 26.0 SEC 


  


 


 


Blood Urea Nitrogen 26 MG/DL  17 MG/DL 


 


Creatinine 0.98 MG/DL  0.72 MG/DL 


 


Random Glucose 124 MG/DL  82 MG/DL 


 


Total Protein 6.7 GM/DL  6.4 GM/DL 


 


Albumin 3.4 GM/DL  3.4 GM/DL 


 


Calcium Level 9.0 MG/DL  9.1 MG/DL 


 


Alkaline Phosphatase 97 U/L  78 U/L 


 


Aspartate Amino Transf


(AST/SGOT) 19 U/L 


  16 U/L 


 


 


Alanine Aminotransferase


(ALT/SGPT) 19 U/L 


  17 U/L 


 


 


Total Bilirubin 0.2 MG/DL  0.4 MG/DL 


 


Sodium Level 143 MEQ/L  144 MEQ/L 


 


Potassium Level 3.6 MEQ/L  3.4 MEQ/L 


 


Chloride Level 109 MEQ/L  111 MEQ/L 


 


Carbon Dioxide Level 29.1 MEQ/L  28.3 MEQ/L 


 


Anion Gap 5 MEQ/L  5 MEQ/L 


 


Estimat Glomerular Filtration


Rate 54 ML/MIN 


  76 ML/MIN 


 


 


Lipase 328 U/L  








Physical Examination


Patient is an elderly woman who is in very poor health.  Obvious generalized 

muscle atrophy.  


HEENT: Pupils round and reactive to light; normocephalic; atraumatic; no 

jaundice.  Throat is clear.


NECK: Neck is supple, no JVD, no lymphadenopathy.


CHEST:  Chest is clear to auscultation and percussion.


CARDIAC: Rate and rhythm is regular.  Ejection systolic murmur grade 2/ 6 heard 

over the precordium


ABDOMEN:  Soft, nondistended, nontender; no hepatosplenomegaly; bowel sounds 

are present in all four quadrants.


EXTREMITIES: No clubbing, cyanosis, or edema.


SKIN:  Normal; no rash; no jaundice.


CNS:  No focal deficits; alert and oriented times three.





Assessment and Plan


Assessment:  


(1) Rectal bleeding


ICD Codes:  K62.5 - Hemorrhage of anus and rectum


Status:  Resolved


(2) Weight loss


ICD Codes:  R63.4 - Abnormal weight loss


(3) Fecal impaction in rectum


ICD Codes:  K56.41 - Fecal impaction


Status:  Resolved


Plan


1.  Patient requires monitoring of serial hemoglobin and hematocrit levels, 

supportive treatment, CT scan of the abdomen and pelvis and a colonoscopy.  

However she wants only comfort and palliative care.  The family also want to go 

with patient's wishes.  Discussed with Dr. Roque.  Will be available to 

continue care in case the patient decides to undergo further investigation











Delon Ascencio MD May 12, 2018 14:07

## 2018-05-13 VITALS
SYSTOLIC BLOOD PRESSURE: 180 MMHG | TEMPERATURE: 97.7 F | OXYGEN SATURATION: 95 % | RESPIRATION RATE: 18 BRPM | HEART RATE: 70 BPM | DIASTOLIC BLOOD PRESSURE: 74 MMHG

## 2018-05-13 VITALS
DIASTOLIC BLOOD PRESSURE: 68 MMHG | SYSTOLIC BLOOD PRESSURE: 165 MMHG | OXYGEN SATURATION: 95 % | HEART RATE: 76 BPM | TEMPERATURE: 98.9 F | RESPIRATION RATE: 20 BRPM

## 2018-05-13 VITALS
HEART RATE: 93 BPM | OXYGEN SATURATION: 94 % | DIASTOLIC BLOOD PRESSURE: 65 MMHG | SYSTOLIC BLOOD PRESSURE: 139 MMHG | TEMPERATURE: 97.3 F | RESPIRATION RATE: 18 BRPM

## 2018-05-13 VITALS
RESPIRATION RATE: 19 BRPM | HEART RATE: 73 BPM | DIASTOLIC BLOOD PRESSURE: 63 MMHG | TEMPERATURE: 97.2 F | OXYGEN SATURATION: 95 % | SYSTOLIC BLOOD PRESSURE: 135 MMHG

## 2018-05-13 LAB
HCT VFR BLD CALC: 31.6 % (ref 35–46)
HGB BLD-MCNC: 10.3 GM/DL (ref 11.6–15.3)

## 2018-05-13 RX ADMIN — Medication SCH ML: at 09:00

## 2018-05-13 RX ADMIN — THIAMINE HYDROCHLORIDE SCH MLS/HR: 100 INJECTION, SOLUTION INTRAMUSCULAR; INTRAVENOUS at 01:47

## 2018-05-13 RX ADMIN — Medication SCH MG: at 18:00

## 2018-05-13 RX ADMIN — LEVOTHYROXINE SODIUM SCH MCG: 75 TABLET ORAL at 05:51

## 2018-05-13 RX ADMIN — PANTOPRAZOLE SCH MG: 40 TABLET, DELAYED RELEASE ORAL at 09:43

## 2018-05-13 RX ADMIN — CARVEDILOL SCH MG: 6.25 TABLET, FILM COATED ORAL at 09:43

## 2018-05-13 RX ADMIN — ESCITALOPRAM OXALATE SCH MG: 10 TABLET, FILM COATED ORAL at 05:51

## 2018-05-13 RX ADMIN — HYDROCODONE BITARTRATE AND ACETAMINOPHEN PRN TAB: 5; 325 TABLET ORAL at 13:54

## 2018-05-13 RX ADMIN — THIAMINE HYDROCHLORIDE SCH MLS/HR: 100 INJECTION, SOLUTION INTRAMUSCULAR; INTRAVENOUS at 15:07

## 2018-05-13 RX ADMIN — Medication SCH MG: at 09:43

## 2018-05-13 RX ADMIN — Medication SCH MG: at 13:00

## 2018-05-13 RX ADMIN — LOSARTAN POTASSIUM SCH MG: 50 TABLET, FILM COATED ORAL at 09:49

## 2018-05-13 RX ADMIN — TOLTERODINE TARTRATE SCH MG: 4 CAPSULE, EXTENDED RELEASE ORAL at 09:49

## 2018-05-13 RX ADMIN — PANTOPRAZOLE SCH MG: 40 TABLET, DELAYED RELEASE ORAL at 20:28

## 2018-05-13 RX ADMIN — Medication SCH ML: at 21:00

## 2018-05-13 RX ADMIN — HYDROCODONE BITARTRATE AND ACETAMINOPHEN PRN TAB: 5; 325 TABLET ORAL at 20:28

## 2018-05-13 RX ADMIN — ATORVASTATIN CALCIUM SCH MG: 10 TABLET, FILM COATED ORAL at 20:28

## 2018-05-13 RX ADMIN — CARVEDILOL SCH MG: 6.25 TABLET, FILM COATED ORAL at 20:28

## 2018-05-13 RX ADMIN — GABAPENTIN SCH MG: 100 CAPSULE ORAL at 20:27

## 2018-05-13 NOTE — HHI.GIFU
GI Follow-up Note


Consult Follow-up





Subjective:  Patient laying in bed comfortably, no new complaints. No bleeding





Objective:


PHYSICAL EXAMINATION:


Vitals signs stable


No fever





HEENT: Pupils round and reactive to light; normocephalic; atraumatic; no 

jaundice.  Throat is clear.


NECK: Neck is supple, no JVD, no lymphadenopathy.


CHEST:  Chest is clear to auscultation and percussion.


CARDIAC:  Regular rate and rhythm with no murmur gallop or rubs.


ABDOMEN:  Soft, nondistended, nontender; no hepatosplenomegaly; bowel sounds 

are present in all four quadrants.


EXTREMITIES: No clubbing, cyanosis, or edema.


SKIN:  Normal; no rash; no jaundice.


CNS:  No focal deficits; alert and oriented times three.


Available Data (labs, X- Rays, Procedues) : 


Last Impressions








Abdomen/Pelvis CT 5/12/18 0000 Signed





Impressions: 





 Service Date/Time:  Saturday, May 12, 2018 19:29 - CONCLUSION:  1. Status post 





 cholecystectomy with some mild dilatation of the biliary system. This is most 





 likely reservoir effect. Recommend correlation with liver laboratory values. 

2. 





 Scattered diverticulosis of the descending and sigmoid colon without 





 inflammatory changes. 3. Left adnexal cyst measuring 4.0 cm. 4. Diffuse 

primary 





 degenerative changes of the lumbar spine and pelvis.     Gaurav Harrison MD 








 Laboratory Tests








Test


  5/11/18


19:05 5/12/18


06:14


 


White Blood Count 4.6 TH/MM3  


 


Red Blood Count 3.48 MIL/MM3  


 


Hemoglobin 10.6 GM/DL  10.7 GM/DL 


 


Hematocrit 30.8 %  33.3 % 


 


Mean Corpuscular Volume 88.6 FL  


 


Mean Corpuscular Hemoglobin 30.5 PG  


 


Mean Corpuscular Hemoglobin


Concent 34.5 % 


  


 


 


Red Cell Distribution Width 20.1 %  


 


Platelet Count 161 TH/MM3  


 


Mean Platelet Volume 10.0 FL  


 


Neutrophils (%) (Auto) 68.5 %  


 


Lymphocytes (%) (Auto) 17.9 %  


 


Monocytes (%) (Auto) 9.0 %  


 


Eosinophils (%) (Auto) 2.7 %  


 


Basophils (%) (Auto) 1.9 %  


 


Neutrophils # (Auto) 3.2 TH/MM3  


 


Lymphocytes # (Auto) 0.8 TH/MM3  


 


Monocytes # (Auto) 0.4 TH/MM3  


 


Eosinophils # (Auto) 0.1 TH/MM3  


 


Basophils # (Auto) 0.1 TH/MM3  


 


CBC Comment DIFF FINAL  


 


Differential Comment   


 


Prothrombin Time 10.3 SEC  


 


Prothromb Time International


Ratio 1.0 RATIO 


  


 


 


Activated Partial


Thromboplast Time 26.0 SEC 


  


 


 


Blood Urea Nitrogen 26 MG/DL  17 MG/DL 


 


Creatinine 0.98 MG/DL  0.72 MG/DL 


 


Random Glucose 124 MG/DL  82 MG/DL 


 


Total Protein 6.7 GM/DL  6.4 GM/DL 


 


Albumin 3.4 GM/DL  3.4 GM/DL 


 


Calcium Level 9.0 MG/DL  9.1 MG/DL 


 


Alkaline Phosphatase 97 U/L  78 U/L 


 


Aspartate Amino Transf


(AST/SGOT) 19 U/L 


  16 U/L 


 


 


Alanine Aminotransferase


(ALT/SGPT) 19 U/L 


  17 U/L 


 


 


Total Bilirubin 0.2 MG/DL  0.4 MG/DL 


 


Sodium Level 143 MEQ/L  144 MEQ/L 


 


Potassium Level 3.6 MEQ/L  3.4 MEQ/L 


 


Chloride Level 109 MEQ/L  111 MEQ/L 


 


Carbon Dioxide Level 29.1 MEQ/L  28.3 MEQ/L 


 


Anion Gap 5 MEQ/L  5 MEQ/L 


 


Estimat Glomerular Filtration


Rate 54 ML/MIN 


  76 ML/MIN 


 


 


Lipase 328 U/L  








 Allergies








Coded Allergies Type Severity Reaction Last Updated Verified


 


  No Known Allergies Allergy Unknown  5/11/18 Yes








 Active Scripts








 Medications  Dose


 Route/Sig


 Max Daily Dose Days Date Category


 


 Ferrous Fumarate


 324 Mg (106 Mg


 Iron) Tab  325 Mg


 PO TID


    5/11/18 Reported


 


 Myrbetriq


  (Mirabegron) 50


 Mg Tab  50 Mg


 PO DAILY


    5/11/18 Reported


 


 Gabapentin 100 Mg


 Cap  100 Mg


 PO HS


    5/11/18 Reported


 


 Escitalopram


  (Escitalopram


 Oxalate) 10 Mg Tab  10 Mg


 PO DAILY


    5/11/18 Reported


 


 Carvedilol 6.25


 Mg Tab  6.25 Mg


 PO BID


    5/11/18 Reported


 


 Atorvastatin


  (Atorvastatin


 Calcium) 10 Mg Tab  10 Mg


 PO HS


    5/11/18 Reported


 


 Levothyroxine


  (Levothyroxine


 Sodium) 75 Mcg Tab  75 Mcg


 PO DAILY


    2/20/17 Reported




















ASSESSMENT/PLAN: Seen and examined with family. No bleeding reported. Last 

colonoscopy 3-4 years ago. Last egd 1 month ago by Dr Kaiser. Colonoscopy with 

salvatore tomorrow. 





It was a pleasure seeing Lanette Moy. Thank you for this consult.


Entered by: Glen Carcamo MD May 13, 2018 17:40

## 2018-05-13 NOTE — HHI.PR
Subjective


Remarks


Patient seen and evaluated in follow-up for GI bleed.  CT abdomen pelvis 

relatively unremarkable.  No further bleeding per patient.  Care plan discussed 

with patient and daughter who now agreed to further evaluation by endoscopy as 

needed.





Objective


Vitals





Vital Signs








  Date Time  Temp Pulse Resp B/P (MAP) Pulse Ox O2 Delivery O2 Flow Rate FiO2


 


5/12/18 20:00 96.6 69 15 165/74 (104) 95   


 


5/12/18 16:00 96.9 70 20 150/66 (94) 95   


 


5/12/18 12:00 97.8 72 20 187/76 (113) 95   














I/O      


 


 5/12/18 5/12/18 5/12/18 5/13/18 5/13/18 5/13/18





 07:00 15:00 23:00 07:00 15:00 23:00


 


Intake Total  450 ml 750 ml 300 ml  


 


Output Total   800 ml   


 


Balance  450 ml -50 ml 300 ml  


 


      


 


Intake Oral  0 ml 750 ml 300 ml  


 


IV Total  450 ml    


 


Output Urine Total   800 ml   


 


# Voids 3   3  


 


# Bowel Movements   0   








Result Diagram:  


5/12/18 0614                                                                   

             5/12/18 0614





Imaging





Last Impressions








Abdomen/Pelvis CT 5/12/18 0000 Signed





Impressions: 





 Service Date/Time:  Saturday, May 12, 2018 19:29 - CONCLUSION:  1. Status post 





 cholecystectomy with some mild dilatation of the biliary system. This is most 





 likely reservoir effect. Recommend correlation with liver laboratory values. 

2. 





 Scattered diverticulosis of the descending and sigmoid colon without 





 inflammatory changes. 3. Left adnexal cyst measuring 4.0 cm. 4. Diffuse 

primary 





 degenerative changes of the lumbar spine and pelvis.     Gaurav Harrison MD 








Objective Remarks


GENERAL: This is a well-nourished, well-developed patient, feels much better


CARDIOVASCULAR: Regular rate and rhythm without murmurs, gallops, or rubs. 


RESPIRATORY: Clear to auscultation. Breath sounds equal bilaterally. No wheezes

, rales, or rhonchi.  


GASTROINTESTINAL: Abdomen soft, non-tender, nondistended. Normal active bowel 

sounds


MUSCULOSKELETAL: Extremities without clubbing, cyanosis, or edema.


NEURO:  Alert & Oriented x4 to person, place, time, situation.  Moves all ext x4





A/P


Problem List:  


(1) Rectal bleeding


ICD Code:  K62.5 - Hemorrhage of anus and rectum


Status:  Resolved


Plan:  Patient with a history of multiple episodes of GI bleeding.  now 

agreeable to eval by endoscopy 


gi recalled


ptn sees dr phoenix vincent gi


ptn now wants colon  endoscopy





(2) Atrial fibrillation


ICD Code:  I48.91 - Unspecified atrial fibrillation


Status:  Acute


Plan:  Patient off blood thinners due to recurrent bleeding.  No current 

arrhythmias noted.  Patient currently stable on Coreg





(3) Weight loss


ICD Code:  R63.4 - Abnormal weight loss


Plan:  Patient with chronic diarrhea and will benefit from supplementation





(4) Malnutrition


ICD Code:  E46 - Unspecified protein-calorie malnutrition


Plan:  Appears to be severe malnutrition


Patient will benefit from supplementation and calorie counts.  Perhaps this is 

due to her chronic diarrhea





(5) HTN (hypertension)


ICD Code:  I10 - Essential (primary) hypertension


Plan:  Add cozaar, cont coreg





Discharge Planning


Pending endoscopy











Lata Roque MD May 13, 2018 09:08

## 2018-05-14 VITALS
RESPIRATION RATE: 18 BRPM | DIASTOLIC BLOOD PRESSURE: 67 MMHG | OXYGEN SATURATION: 94 % | TEMPERATURE: 97 F | HEART RATE: 63 BPM | SYSTOLIC BLOOD PRESSURE: 162 MMHG

## 2018-05-14 VITALS
SYSTOLIC BLOOD PRESSURE: 190 MMHG | DIASTOLIC BLOOD PRESSURE: 83 MMHG | OXYGEN SATURATION: 94 % | TEMPERATURE: 97.5 F | RESPIRATION RATE: 20 BRPM | HEART RATE: 119 BPM

## 2018-05-14 VITALS
RESPIRATION RATE: 20 BRPM | OXYGEN SATURATION: 92 % | HEART RATE: 75 BPM | DIASTOLIC BLOOD PRESSURE: 68 MMHG | TEMPERATURE: 96.7 F | SYSTOLIC BLOOD PRESSURE: 130 MMHG

## 2018-05-14 VITALS
SYSTOLIC BLOOD PRESSURE: 162 MMHG | TEMPERATURE: 97 F | HEART RATE: 63 BPM | OXYGEN SATURATION: 94 % | RESPIRATION RATE: 18 BRPM | DIASTOLIC BLOOD PRESSURE: 67 MMHG

## 2018-05-14 VITALS
DIASTOLIC BLOOD PRESSURE: 63 MMHG | TEMPERATURE: 97 F | OXYGEN SATURATION: 94 % | SYSTOLIC BLOOD PRESSURE: 138 MMHG | HEART RATE: 75 BPM | RESPIRATION RATE: 18 BRPM

## 2018-05-14 LAB
HCT VFR BLD CALC: 33.6 % (ref 35–46)
HGB BLD-MCNC: 10.6 GM/DL (ref 11.6–15.3)

## 2018-05-14 RX ADMIN — Medication SCH MG: at 09:16

## 2018-05-14 RX ADMIN — CARVEDILOL SCH MG: 6.25 TABLET, FILM COATED ORAL at 21:03

## 2018-05-14 RX ADMIN — HYDROCODONE BITARTRATE AND ACETAMINOPHEN PRN TAB: 5; 325 TABLET ORAL at 05:39

## 2018-05-14 RX ADMIN — Medication SCH ML: at 09:00

## 2018-05-14 RX ADMIN — LEVOTHYROXINE SODIUM SCH MCG: 75 TABLET ORAL at 05:39

## 2018-05-14 RX ADMIN — ATORVASTATIN CALCIUM SCH MG: 10 TABLET, FILM COATED ORAL at 21:02

## 2018-05-14 RX ADMIN — ESCITALOPRAM OXALATE SCH MG: 10 TABLET, FILM COATED ORAL at 05:40

## 2018-05-14 RX ADMIN — THIAMINE HYDROCHLORIDE SCH MLS/HR: 100 INJECTION, SOLUTION INTRAMUSCULAR; INTRAVENOUS at 05:39

## 2018-05-14 RX ADMIN — PANTOPRAZOLE SCH MG: 40 TABLET, DELAYED RELEASE ORAL at 21:02

## 2018-05-14 RX ADMIN — Medication SCH ML: at 21:02

## 2018-05-14 RX ADMIN — TOLTERODINE TARTRATE SCH MG: 4 CAPSULE, EXTENDED RELEASE ORAL at 09:15

## 2018-05-14 RX ADMIN — Medication SCH MG: at 13:00

## 2018-05-14 RX ADMIN — PANTOPRAZOLE SCH MG: 40 TABLET, DELAYED RELEASE ORAL at 09:16

## 2018-05-14 RX ADMIN — GABAPENTIN SCH MG: 100 CAPSULE ORAL at 21:02

## 2018-05-14 RX ADMIN — THIAMINE HYDROCHLORIDE SCH MLS/HR: 100 INJECTION, SOLUTION INTRAMUSCULAR; INTRAVENOUS at 06:39

## 2018-05-14 RX ADMIN — CARVEDILOL SCH MG: 6.25 TABLET, FILM COATED ORAL at 09:15

## 2018-05-14 RX ADMIN — Medication SCH MG: at 18:32

## 2018-05-14 RX ADMIN — LOSARTAN POTASSIUM SCH MG: 50 TABLET, FILM COATED ORAL at 09:16

## 2018-05-14 RX ADMIN — THIAMINE HYDROCHLORIDE SCH MLS/HR: 100 INJECTION, SOLUTION INTRAMUSCULAR; INTRAVENOUS at 17:47

## 2018-05-14 RX ADMIN — Medication PRN ML: at 05:39

## 2018-05-14 NOTE — GIPROC
[facility]

[address] [place] [address], [id number]

 

 

COLONOSCOPY PROCEDURE REPORT     EXAM DATE: 05/14/2018

 

PATIENT NAME:      Lanette Moy           MR #:      [id number]

YOB: 1930      VISIT #:     [id number]

ENDOSCOPIST:     Glen Sethi MD     ORDER #:     [id number]

ASSISTANT:      Noah Mota     STATUS:     inpatient

 

INDICATIONS:  The patient is a 88 yr old female here for a colonoscopy due to

iron deficiency anemia and hematochezia

PROCEDURE PERFORMED:     Colonoscopy, diagnostic

MEDICATIONS:     None and Per Anesthesia.

PREP QUALITY:     The Maxwell Bowel Prep Score was Right colon 1, Mid colon 2,

and Left colon 2.  Total = 5. PREP TYPE:GoLytely

ESTIMATED BLOOD LOSS:     None

 

CONSENT: The patient understands the risks and benefits of the procedure and

understands that these risks include, but are not limited to: sedation,

allergic reaction, infection, perforation and/or bleeding. Alternative means of

evaluation and treatment include, among others: physical exam, x-rays, and/or

surgical intervention. The patient elects to proceed with this endoscopic

procedure.

 



medical equipment was checked for proper function. Hand hygiene and appropriate

measures for infection prevention was taken. After the risks, benefits and

alternatives of the procedure were thoroughly explained, Informed consent was

verified, confirmed and timeout was successfully executed by the treatment

team. A digital exam revealed external hemorrhoids The Pentax [id number]

endoscope was introduced through the anus and advanced to the cecum, which was

identified by both the appendix and ileocecal valve. The instrument was then

slowly withdrawn as the colon was fully examined.

 

 

COLON FINDINGS: There was severe diverticulosis noted in the sigmoid colon with

associated tortuosity and muscular hypertrophy.  No bleeding was noted from the

diverticulosis.   Blood throughout the colon.  Very friable mucosa in the

rectosigmoid with significant spasticity of the colon. Retroflexed views

revealed internal hemorrhoids and Retroflexed views revealed medium internal

hemorrhoids The scope was then completely withdrawn from the patient and the

procedure terminated.

PROCEDURE WITHDRAWAL TIME:10minutes

 

 

 

ADVERSE EVENTS:      There were no complications.

IMPRESSIONS:     1.  There was severe diverticulosis noted in the sigmoid colon

2.  Blood throughout the colon.  Very friable mucosa in the rectosigmoid with

significant spasticity of the colon

3.  Retroflexed views revealed internal hemorrhoids

4.  Retroflexed views revealed medium internal hemorrhoids

5.  Revealed external hemorrhoids

 

RECOMMENDATIONS:     1.  Continue surveillance

2.  Yearly hemoccult

3.  High fiber diet

4.  No seeds, nuts and popcorn in diet

RECALL:     Return 3 months Colonoscopy

 

_____________________________

Glen Sethi MD

eSigned:  Glen Sethi MD 05/14/2018 5:04 PM

 

 

cc:

 

 

 

 

PATIENT NAME:  Lanette Moy

MR#: [id number]

## 2018-05-14 NOTE — HHI.FF
Face to Face Verification


Diagnosis:  


(1) HTN (hypertension)


(2) Malnutrition


(3) Weight loss


Physical Therapy


Order:  Evaluate and Treat, Improve ambulation





Occupational Therapy


Order:  Evaluate and Treat, Improve ADL





Home Health Nursing








Order: Medical education

















I have seen patient Lanette Moy on 5/14/18. My clinical findings 

support the need for the requested home health care services because:








 Deconditioned w/ increased weakness














I certify that my clinical findings support that this patient is homebound 

because:








 Unsteady gait/balance

















Lata Roque MD May 14, 2018 13:44

## 2018-05-14 NOTE — HHI.PR
Subjective


Remarks


Patient is seen today in follow-up for GI bleed.  Hemoglobin remained stable.  

No new events.  Some evidence of bleeding today per nursing team


Patient for endoscopy this afternoon





Objective


Vitals





Vital Signs








  Date Time  Temp Pulse Resp B/P (MAP) Pulse Ox O2 Delivery O2 Flow Rate FiO2


 


5/14/18 12:00 97.0 63 18 162/67 (98) 94   


 


5/14/18 08:00 97.0 75 18 138/63 (88) 94   


 


5/14/18 06:39   20     


 


5/14/18 00:00 97.5 119 20 190/83 (118) 94   


 


5/13/18 20:00 97.3 93 18 139/65 (89) 94   


 


5/13/18 16:00 97.2 73 19 135/63 (87) 95   














I/O      


 


 5/13/18 5/13/18 5/13/18 5/14/18 5/14/18 5/14/18





 06:59 14:59 22:59 06:59 14:59 22:59


 


Intake Total 300 ml 120 ml  842 ml  


 


Balance 300 ml 120 ml  842 ml  


 


      


 


Intake Oral 300 ml 120 ml    


 


IV Total    842 ml  


 


# Voids 3   2  


 


# Bowel Movements    11  








Result Diagram:  


5/14/18 0150                                                                   

             5/12/18 0614





Objective Remarks


GENERAL: This is a well-nourished, well-developed patient, feels much better


CARDIOVASCULAR: Regular rate and rhythm without murmurs, gallops, or rubs. 


RESPIRATORY: Clear to auscultation. Breath sounds equal bilaterally. No wheezes

, rales, or rhonchi.  


GASTROINTESTINAL: Abdomen soft, non-tender, nondistended. Normal active bowel 

sounds


MUSCULOSKELETAL: Extremities without clubbing, cyanosis, or edema.


NEURO:  Alert & Oriented x4 to person, place, time, situation.  Moves all ext x4





A/P


Problem List:  


(1) Rectal bleeding


ICD Code:  K62.5 - Hemorrhage of anus and rectum


Status:  Resolved


Plan:  Patient with a history of multiple episodes of GI bleeding. 


endoscopy this pm





(2) Atrial fibrillation


ICD Code:  I48.91 - Unspecified atrial fibrillation


Status:  Acute


Plan:  controlled, Patient off blood thinners due to recurrent bleeding.  No 

current arrhythmias noted.  Patient currently stable on Coreg





(3) Weight loss


ICD Code:  R63.4 - Abnormal weight loss


Plan:  Patient with chronic diarrhea and will benefit from supplementation


severe malnutrition





(4) Malnutrition


ICD Code:  E46 - Unspecified protein-calorie malnutrition


Plan:  Appears to be severe malnutrition


Patient will benefit from supplementation and calorie counts.  Perhaps this is 

due to her chronic diarrhea





(5) HTN (hypertension)


ICD Code:  I10 - Essential (primary) hypertension


Plan:  controlled on cozaar, cont coreg





Discharge Planning


Pending endoscopy











Lata Roque MD May 14, 2018 12:23

## 2018-05-15 VITALS
HEART RATE: 72 BPM | RESPIRATION RATE: 19 BRPM | SYSTOLIC BLOOD PRESSURE: 138 MMHG | TEMPERATURE: 98 F | OXYGEN SATURATION: 95 % | DIASTOLIC BLOOD PRESSURE: 61 MMHG

## 2018-05-15 VITALS
DIASTOLIC BLOOD PRESSURE: 80 MMHG | TEMPERATURE: 96.7 F | HEART RATE: 78 BPM | SYSTOLIC BLOOD PRESSURE: 160 MMHG | RESPIRATION RATE: 20 BRPM | OXYGEN SATURATION: 91 %

## 2018-05-15 VITALS
DIASTOLIC BLOOD PRESSURE: 53 MMHG | OXYGEN SATURATION: 95 % | HEART RATE: 72 BPM | TEMPERATURE: 96.9 F | SYSTOLIC BLOOD PRESSURE: 110 MMHG | RESPIRATION RATE: 19 BRPM

## 2018-05-15 RX ADMIN — LOSARTAN POTASSIUM SCH MG: 50 TABLET, FILM COATED ORAL at 08:59

## 2018-05-15 RX ADMIN — ESCITALOPRAM OXALATE SCH MG: 10 TABLET, FILM COATED ORAL at 05:45

## 2018-05-15 RX ADMIN — Medication SCH MG: at 09:12

## 2018-05-15 RX ADMIN — Medication SCH MG: at 13:00

## 2018-05-15 RX ADMIN — TOLTERODINE TARTRATE SCH MG: 4 CAPSULE, EXTENDED RELEASE ORAL at 08:59

## 2018-05-15 RX ADMIN — HYDROCODONE BITARTRATE AND ACETAMINOPHEN PRN TAB: 5; 325 TABLET ORAL at 09:00

## 2018-05-15 RX ADMIN — CARVEDILOL SCH MG: 6.25 TABLET, FILM COATED ORAL at 08:59

## 2018-05-15 RX ADMIN — THIAMINE HYDROCHLORIDE SCH MLS/HR: 100 INJECTION, SOLUTION INTRAMUSCULAR; INTRAVENOUS at 04:17

## 2018-05-15 RX ADMIN — PANTOPRAZOLE SCH MG: 40 TABLET, DELAYED RELEASE ORAL at 09:00

## 2018-05-15 RX ADMIN — Medication SCH ML: at 08:59

## 2018-05-15 RX ADMIN — LEVOTHYROXINE SODIUM SCH MCG: 75 TABLET ORAL at 05:45

## 2018-05-15 NOTE — EKG
Date Performed: 05/14/2018       Time Performed: 14:07:12

 

PTAGE:      88 years

 

EKG:      Sinus rhythm 

 

 WITH FIRST DEGREE AV BLOCK ABNORMAL ECG

 

PREVIOUS TRACING       : 05/14/2018 08.35 Since the previous tracing, no significant change noted

 

DOCTOR:   Yessica Hernandez  Interpretating Date/Time  05/15/2018 18:06:21

## 2018-05-15 NOTE — HHI.DS
__________________________________________________





Discharge Summary


Admission Date


May 11, 2018 at 19:44


Discharge Date:  May 15, 2018


Admitting Diagnosis





LARGE GI BLEED, MILD ANEMIA, HEMODYNAMICALLY STABLE





(1) Rectal bleeding


ICD Code:  K62.5 - Hemorrhage of anus and rectum


Status:  Resolved


(2) Atrial fibrillation


ICD Code:  I48.91 - Unspecified atrial fibrillation


Status:  Acute


(3) Weight loss


ICD Code:  R63.4 - Abnormal weight loss


(4) Malnutrition


ICD Code:  E46 - Unspecified protein-calorie malnutrition


(5) HTN (hypertension)


ICD Code:  I10 - Essential (primary) hypertension


Procedures


Colonoscopy, hemorrhoids


Brief History - From Admission


Ptn complains of at least 2 episodes of painless GI bleed over the last 2 days.

  Saw dr Marroquin and had Upper gi which showed esophageal stricture, and had 

cauterization of a bleeding area (4/2018). Ptn loosing weight due to diarrhea 

over 21 years, but worse over the last few weeks. Patient refused colostomy per 

her previous evaluation by colorectal surgery for colorectal cancer.  Patient 

did have radiation.  Patient has been on a blood thinner past but had some 

issues with anemia and this was stopped.  She has been on iron pills.  The 

patient's family is quite concerned however the patient requests comfort care.  

I did discuss at length with the patient's family regarding honoring the patient

's wishes.  At this point the patient appears to have capacity and decision 

making.  She reports a weight loss.  Anemia.  And fatigue.  In any case 

gastroneurology consultation was held due to further evaluation for patient's 

and family planning.  Patient may require palliative care eval versus hospice 

care.  Her hemoglobin has remained stable.


CBC/BMP:  


5/14/18 0150                                                                   

             5/12/18 0614





Significant Findings





Laboratory Tests








Test


  5/13/18


18:30 5/14/18


01:50


 


Hemoglobin


  10.3 GM/DL


(11.6-15.3) 10.6 GM/DL


(11.6-15.3)


 


Hematocrit


  31.6 %


(35.0-46.0) 33.6 %


(35.0-46.0)








Imaging





Last Impressions








Abdomen/Pelvis CT 5/12/18 0000 Signed





Impressions: 





 Service Date/Time:  Saturday, May 12, 2018 19:29 - CONCLUSION:  1. Status post 





 cholecystectomy with some mild dilatation of the biliary system. This is most 





 likely reservoir effect. Recommend correlation with liver laboratory values. 

2. 





 Scattered diverticulosis of the descending and sigmoid colon without 





 inflammatory changes. 3. Left adnexal cyst measuring 4.0 cm. 4. Diffuse 

primary 





 degenerative changes of the lumbar spine and pelvis.     Gaurav Harrison MD 








PE at Discharge


GENERAL: This is a well-nourished, well-developed patient, feels much better


CARDIOVASCULAR: Regular rate and rhythm without murmurs, gallops, or rubs. 


RESPIRATORY: Clear to auscultation. Breath sounds equal bilaterally. No wheezes

, rales, or rhonchi.  


GASTROINTESTINAL: Abdomen soft, non-tender, nondistended. Normal active bowel 

sounds


MUSCULOSKELETAL: Extremities without clubbing, cyanosis, or edema.


NEURO:  Alert & Oriented x4 to person, place, time, situation.  Moves all ext x4


Pt update on day of discharge


Seen and evaluated today in follow for discharge planning


Hemoglobin remained stable.  Discussed with family and medical team regarding 

home health care


Hospital Course


She seen and evaluated today and hemorrhoids are internal and had some evidence 

of bleeding.  Hemoglobin has remained stable at this time.  Patient's fiber has 

been increasing her diet.  Patient education is provided to the patient and the 

family.  Patient is discharged with home health care


Pt Condition on Discharge:  Good


Discharge Disposition:  Disch w/ Home Health Serv


Discharge Time:  <= 30 minutes


Discharge Instructions


DIET: Follow Instructions for:  As Tolerated, No Restrictions, High Fiber Diet


Activities you can perform:  Regular-No Restrictions


New Medications:  


Polyethylene Glycol 3350 Powder (Miralax Powder) 17 Gm Powd


17 GM PO DAILY for Constipation, #1 CAN 0 Refills


Mix and dissolve one measuring cap-ful (17 grams) in water or juice.


Hydrocodone/Acetaminophen (Hydrocodone-Acetamin 5-325 mg) 5 Mg-325 Mg Tablet


1 TAB PO Q6H PRN for PAIN SCALE 6 TO 10, #10 TAB





Losartan (Cozaar) 50 Mg Tab


50 MG PO DAILY for Blood Pressure Management, #31 TAB





 


Continued Medications:  


Atorvastatin (Atorvastatin) 10 Mg Tab


10 MG PO HS for Cholesterol Management, #30 TAB 0 Refills





Carvedilol (Carvedilol) 6.25 Mg Tab


6.25 MG PO BID, #60 TAB 0 Refills





Escitalopram (Escitalopram) 10 Mg Tab


10 MG PO DAILY, #30 TAB 0 Refills





Ferrous Fumarate (Ferrous Fumarate) 324 Mg (106 Mg Iron) Tab


325 MG PO TID for Nutritional Supplement, #60 TAB 0 Refills





Gabapentin (Gabapentin) 100 Mg Cap


100 MG PO HS, #30 CAP 0 Refills





Levothyroxine (Levothyroxine) 75 Mcg Tab


75 MCG PO DAILY for Thyroid, #30 TAB 0 Refills





Mirabegron (Myrbetriq) 50 Mg Tab


50 MG PO DAILY for Urinary Symptom Managemen, #30 TAB 0 Refills

















Lata Roque MD May 15, 2018 12:14

## 2018-05-15 NOTE — EKG
Date Performed: 2018       Time Performed: 08:35:13

 

PTAGE:      88 years

 

EKG:      Sinus rhythm 

 

 WITH FIRST DEGREE AV BLOCK ABNORMAL ECG Compared to 

 

 PREVIOUS TRACING            , the patient is no longer in atrial fibrillation. PREVIOUS TRACIN 07.04

 

DOCTOR:   Yessica Hernandez  Interpretating Date/Time  2018 06:42:15

## 2019-06-24 NOTE — MB
Breakfast tray provided, but patient doesn't want to eat now, will continue to monitor and 
care. cc:

MEESE,DAVID L. MD

****

 

 

DATE OF CONSULTATION

02/20/2017

 

HISTORY OF PRESENT ILLNESS

This is an 86-year-old who is 20 years status post radiation chemotherapy for a

squamous cell carcinoma of the anus.  The patient has had intermittent episodes

of diarrhea and constipation for the past year.  She went to the emergency room

yesterday with an episode of inability to move her bowels associated anal pain

and some small amounts of red rectal bleeding.  The patient has had several

bowel movements since admission.

 

PHYSICAL EXAMINATION

VITAL SIGNS:  On exam the patient is afebrile.  Normal vital signs.

GENERAL:  She is alert, without distress.  She states that she has some

soreness in the anus.

ABDOMEN:  Her abdomen is soft and nontender.

RECTAL:  Inspection anus shows loose stools in the patient's bed.  No blood is

visible.  Digital exam reveals mild anal stenosis but it does allow insertion

of the index finger easily.  There is no fecal impaction at this time.  The

right mucosa is palpably normal.

 

ASSESSMENT

Resolution of fecal impaction.

 

PLAN

The patient can be discharged in the hospital on a high-fiber diet illness and

I will see her in the office in a week for sigmoidoscopy.

 

 

 

 

                              _________________________________

                              David Meese, MD

DM/SSB

D:  2/20/2017/11:24 AM

T:  2/20/2017/11:52 AM

Visit #:  [id number]

Job #:  [id number]

## 2023-11-13 NOTE — HHI.HP
__________________________________________________





HPI


Service


[facility]


Primary Care Physician


Unknown


Admission Diagnosis





LARGE GI BLEED, MILD ANEMIA, HEMODYNAMICALLY STABLE


Diagnoses:  


Chief Complaint:  


GI bleed


Travel History


International Travel<30 Days:  No


Contact w/Intl Traveler <30 Da:  No


Traveled to Known Affected Are:  No


History of Present Illness


Ptn complains of at least 2 episodes of painless GI bleed over the last 2 days.

  Saw dr Marroquin and had Upper gi which showed esophageal stricture, and had 

cauterization of a bleeding area (2018). Ptn loosing weight due to diarrhea 

over 21 years, but worse over the last few weeks. Patient refused colostomy per 

her previous evaluation by colorectal surgery for colorectal cancer.  Patient 

did have radiation.  Patient has been on a blood thinner past but had some 

issues with anemia and this was stopped.  She has been on iron pills.  The 

patient's family is quite concerned however the patient requests comfort care.  

I did discuss at length with the patient's family regarding honoring the patient

's wishes.  At this point the patient appears to have capacity and decision 

making.  She reports a weight loss.  Anemia.  And fatigue.  In any case 

gastroneurology consultation was held due to further evaluation for patient's 

and family planning.  Patient may require palliative care eval versus hospice 

care.  Her hemoglobin has remained stable.





Review of Systems


Constitutional:  COMPLAINS OF: Weight loss, DENIES: Diaphoretic episodes, 

Fatigue, Fever, Weight gain, Chills, Dizziness, Change in appetite, Night Sweats


Endocrine:  DENIES: Abnorml menstrual pattern, Heat/cold intolerance, Polydipsia

, Polyuria, Polyphagia


Eyes:  DENIES: Blurred vision, Diplopia, Eye inflammation, Eye pain, Vision loss

, Photosensitivity, Double Vision


Ears, nose, mouth, throat:  DENIES: Tinnitus, Hearing loss, Vertigo, Nasal 

discharge, Oral lesions, Throat pain, Hoarseness, Ear Pain, Running Nose, 

Epistaxis, Sinus Pain, Toothache, Odynophagia


Respiratory:  DENIES: Apneas, Cough, Snoring, Wheezing, Hemoptysis, Sputum 

production, Shortness of breath


Cardiovascular:  DENIES: Chest pain, Palpitations, Syncope, Dyspnea on Exertion

, PND, Lower Extremity Edema, Orthopnea, Claudication


Gastrointestinal:  COMPLAINS OF: Bloody stools, Diarrhea, DENIES: Abdominal pain

, Black stools, Constipation, Nausea, Vomiting, Difficulty Swallowing, Anorexia


Genitourinary:  DENIES: Abnormal vaginal bleeding, Dysmenorrhea, Dyspareunia, 

Sexual dysfunction, Urinary frequency, Urinary incontinence, Urgency, Hematuria

, Dysuria, Nocturia, Vaginal discharge


Musculoskeletal:  DENIES: Joint pain, Muscle aches, Stiffness, Joint Swelling, 

Back pain, Neck pain


Integumentary:  DENIES: Abnormal pigmentation, Pruritus, Rash, Nail changes, 

Breast masses, Breast skin changes, Nipple discharge


Hematologic/lymphatic:  DENIES: Bruising, Lymphadenopathy


Immunologic/allergic:  DENIES: Eczema, Urticaria


Neurologic:  DENIES: Abnormal gait, Headache, Localized weakness, Paresthesias, 

Seizures, Speech Problems, Tremor, Poor Balance


Psychiatric:  DENIES: Anxiety, Confusion, Mood changes, Depression, 

Hallucinations, Agitation, Suicidal Ideation, Homicidal Ideation, Delusions


Except as stated in HPI:  all other systems reviewed are Neg





Past Family Social History


Past Medical History


Hypothyroidism


Atrial fibrillation


Renal insufficiency


History of DVT


Past Surgical History


Colon/rectal resection


Renal stent, left


Ectomy


Hysterectomy


Reported Medications


Reviewed in the EMR


Allergies:  


Coded Allergies:  


     No Known Allergies (Verified  Allergy, Unknown, 18)


Active Ordered Medications


Reviewed in the EMR


Family History


Mother  of pancreatic cancer, father had a brain bleed


Social History


Lives with her , no tobacco or alcohol dependency





Physical Exam


Vital Signs





Vital Signs








  Date Time  Temp Pulse Resp B/P (MAP) Pulse Ox O2 Delivery O2 Flow Rate FiO2


 


18 07:59 97.2 76 19 192/87 (122) 95   


 


18 01:18 96.2 67 20 186/82 (116) 94   


 


18 20:40        


 


18 20:35  70 18 177/67 (103) 96 Room Air  


 


18 19:10  73 16 164/62 (96) 96 Room Air  


 


18 18:23 98.9 76 18 197/79 (118) 96   








Physical Exam


GENERAL: This is a frail female who appears weak


SKIN: No rashes, ecchymoses or lesions. Cool and dry.


HEAD: Atraumatic. Normocephalic. No temporal or scalp tenderness.


EYES: Pupils equal round and reactive. Extraocular motions intact. No scleral 

icterus. No injection or drainage. 


ENT: Nose without bleeding, purulent drainage or septal hematoma. Throat 

without erythema, tonsillar hypertrophy or exudate. Uvula midline. Airway 

patent.


NECK: Trachea midline. No JVD or lymphadenopathy. Supple, nontender, no 

meningeal signs.


CARDIOVASCULAR: Regular rate and rhythm without murmurs, gallops, or rubs. 


RESPIRATORY: Clear to auscultation. Breath sounds equal bilaterally. No wheezes

, rales, or rhonchi.  


GASTROINTESTINAL: Abdomen soft, non-tender, nondistended. No hepato-splenomegaly

, or palpable masses. No guarding.


MUSCULOSKELETAL: Extremities without clubbing, cyanosis, or edema. No joint 

tenderness, effusion, or edema noted. No calf tenderness. Negative Homans sign 

bilaterally.


NEUROLOGICAL: Awake and alert. Cranial nerves II through XII intact.  Motor and 

sensory grossly within normal limits. Five out of 5 muscle strength in all 

muscle groups.  Normal speech.


Laboratory





Laboratory Tests








Test


  18


19:05 18


06:14


 


White Blood Count 4.6  


 


Red Blood Count 3.48  


 


Hemoglobin 10.6  


 


Hematocrit 30.8  


 


Mean Corpuscular Volume 88.6  


 


Mean Corpuscular Hemoglobin 30.5  


 


Mean Corpuscular Hemoglobin


Concent 34.5 


  


 


 


Red Cell Distribution Width 20.1  


 


Platelet Count 161  


 


Mean Platelet Volume 10.0  


 


Neutrophils (%) (Auto) 68.5  


 


Lymphocytes (%) (Auto) 17.9  


 


Monocytes (%) (Auto) 9.0  


 


Eosinophils (%) (Auto) 2.7  


 


Basophils (%) (Auto) 1.9  


 


Neutrophils # (Auto) 3.2  


 


Lymphocytes # (Auto) 0.8  


 


Monocytes # (Auto) 0.4  


 


Eosinophils # (Auto) 0.1  


 


Basophils # (Auto) 0.1  


 


CBC Comment DIFF FINAL  


 


Differential Comment   


 


Prothrombin Time 10.3  


 


Prothromb Time International


Ratio 1.0 


  


 


 


Activated Partial


Thromboplast Time 26.0 


  


 


 


Blood Urea Nitrogen 26  17 


 


Creatinine 0.98  0.72 


 


Random Glucose 124  82 


 


Total Protein 6.7  6.4 


 


Albumin 3.4  3.4 


 


Calcium Level 9.0  9.1 


 


Alkaline Phosphatase 97  78 


 


Aspartate Amino Transf


(AST/SGOT) 19 


  16 


 


 


Alanine Aminotransferase


(ALT/SGPT) 19 


  17 


 


 


Total Bilirubin 0.2  0.4 


 


Sodium Level 143  144 


 


Potassium Level 3.6  3.4 


 


Chloride Level 109  111 


 


Carbon Dioxide Level 29.1  28.3 


 


Anion Gap 5  5 


 


Estimat Glomerular Filtration


Rate 54 


  76 


 


 


Lipase 328  








Result Diagram:  


18 1905                                                                   

             18 0614








Caprini VTE Risk Assessment


Capmal VTE Risk Assessment:  Mod/High Risk (score >= 2)


VTE Pharm Contraindication:  Active bleeding


Caprini Risk Assessment Model











 Point Value = 1          Point Value = 2  Point Value = 3  Point Value = 5


 


Age 41-60


Minor surgery


BMI > 25 kg/m2


Swollen legs


Varicose veins


Pregnancy or postpartum


History of unexplained or recurrent


   spontaneous 


Oral contraceptives or hormone


   replacement


Sepsis (< 1 month)


Serious lung disease, including


   pneumonia (< 1 month)


Abnormal pulmonary function


Acute myocardial infarction


Congestive heart failure (< 1 month)


History of inflammatory bowel disease


Medical patient at bed rest Age 61-74


Arthroscopic surgery


Major open surgery (> 45 min)


Laparoscopic surgery (> 45 min)


Malignancy


Confined to bed (> 72 hours)


Immobilizing plaster cast


Central venous access Age >= 75


History of VTE


Family history of VTE


Factor V Leiden


Prothrombin 02604P


Lupus anticoagulant


Anticardiolipin antibodies


Elevated serum homocysteine


Heparin-induced thrombocytopenia


Other congenital or acquired


   thrombophilia Stroke (< 1 month)


Elective arthroplasty


Hip, pelvis, or leg fracture


Acute spinal cord injury (< 1 month)








Prophylaxis Regimen











   Total Risk


Factor Score Risk Level Prophylaxis Regimen


 


0-1      Low Early ambulation


 


2 Moderate Order ONE of the following:


*Sequential Compression Device (SCD)


*Heparin 5000 units SQ BID


 


3-4 Higher Order ONE of the following medications:


*Heparin 5000 units SQ TID


*Enoxaparin/Lovenox 40 mg SQ daily (WT < 150 kg, CrCl > 30 mL/min)


*Enoxaparin/Lovenox 30 mg SQ daily (WT < 150 kg, CrCl > 10-29 mL/min)


*Enoxaparin/Lovenox 30 mg SQ BID (WT < 150 kg, CrCl > 30 mL/min)


AND/OR


*Sequential Compression Device (SCD)


 


5 or more Highest Order ONE of the following medications:


*Heparin 5000 units SQ TID (Preferred with Epidurals)


*Enoxaparin/Lovenox 40 mg SQ daily (WT < 150 kg, CrCl > 30 mL/min)


*Enoxaparin/Lovenox 30 mg SQ daily (WT < 150 kg, CrCl > 10-29 mL/min)


*Enoxaparin/Lovenox 30 mg SQ BID (WT < 150 kg, CrCl > 30 mL/min)


AND


*Sequential Compression Device (SCD)











Assessment and Plan


Problem List:  


(1) Rectal bleeding


ICD Code:  K62.5 - Hemorrhage of anus and rectum


Status:  Resolved


Plan:  Patient with a history of multiple episodes of GI bleeding.  This point 

patient does not want any aggressive workup done.  She would like comfort care 

measures.  Her daughter would like to discuss this further with her mother.  

Will defer gastroenterology/colorectal surgery eval at this time due to patient'

s wishes.  She did have upper GI 2018 which did show some gastritis and she 

had some cauterization as well as a stricture evaluation.  Records are 

unavailable and this is reported by the daughter who is her primary caregiver.


We will continue to follow hemoglobin.  Monitor the patient's vital signs.


Follow-up CT abdomen pelvis





(2) Atrial fibrillation


ICD Code:  I48.91 - Unspecified atrial fibrillation


Status:  Acute


Plan:  Patient off blood thinners due to recurrent bleeding.  No current 

arrhythmias noted.  Patient currently stable on Coreg





(3) Weight loss


ICD Code:  R63.4 - Abnormal weight loss


Plan:  Patient with chronic diarrhea and will benefit from supplementation





(4) Malnutrition


ICD Code:  E46 - Unspecified protein-calorie malnutrition


Plan:  Appears to be severe malnutrition


Patient will benefit from supplementation and calorie counts.  Perhaps this is 

due to her chronic diarrhea





Code Status


Full code at the moment however patient would like to discuss with her family 

regarding DO NOT RESUSCITATE status


Discussed Condition With


Patient, daughter











JudeLata Borrero MD May 12, 2018 11:34 Refusing vitals, refusing PO medications, no acute events over weekend.   Refusing vitals, refusing PO medications, no acute events over weekend. Slept 6 hours per sleep log.    Patient found talking to self in room, muttering "cut her down" and "shut him down" and "trying to tell you to kill." Unable to be directed towards any form of conversation. Upper extremity tremor noted.